# Patient Record
Sex: FEMALE | Race: BLACK OR AFRICAN AMERICAN | NOT HISPANIC OR LATINO | Employment: PART TIME | URBAN - METROPOLITAN AREA
[De-identification: names, ages, dates, MRNs, and addresses within clinical notes are randomized per-mention and may not be internally consistent; named-entity substitution may affect disease eponyms.]

---

## 2021-09-23 ENCOUNTER — ANCILLARY PROCEDURE (OUTPATIENT)
Dept: CT IMAGING | Facility: CLINIC | Age: 61
End: 2021-09-23
Attending: FAMILY MEDICINE
Payer: MEDICARE

## 2021-09-23 ENCOUNTER — OFFICE VISIT (OUTPATIENT)
Dept: PEDIATRICS | Facility: CLINIC | Age: 61
End: 2021-09-23
Payer: MEDICARE

## 2021-09-23 ENCOUNTER — OFFICE VISIT (OUTPATIENT)
Dept: URGENT CARE | Facility: URGENT CARE | Age: 61
End: 2021-09-23
Payer: MEDICARE

## 2021-09-23 VITALS
HEART RATE: 83 BPM | RESPIRATION RATE: 18 BRPM | DIASTOLIC BLOOD PRESSURE: 82 MMHG | BODY MASS INDEX: 66.33 KG/M2 | SYSTOLIC BLOOD PRESSURE: 145 MMHG | WEIGHT: 293 LBS | OXYGEN SATURATION: 100 % | TEMPERATURE: 96.4 F

## 2021-09-23 VITALS
OXYGEN SATURATION: 96 % | DIASTOLIC BLOOD PRESSURE: 87 MMHG | RESPIRATION RATE: 18 BRPM | HEART RATE: 79 BPM | TEMPERATURE: 98.1 F | SYSTOLIC BLOOD PRESSURE: 125 MMHG

## 2021-09-23 DIAGNOSIS — R10.32 ABDOMINAL PAIN, LEFT LOWER QUADRANT: ICD-10-CM

## 2021-09-23 DIAGNOSIS — R91.8 PULMONARY NODULES: ICD-10-CM

## 2021-09-23 DIAGNOSIS — B37.31 YEAST VAGINITIS: Primary | ICD-10-CM

## 2021-09-23 DIAGNOSIS — R74.8 ABNORMAL SERUM LEVEL OF LIPASE: ICD-10-CM

## 2021-09-23 DIAGNOSIS — R91.1 LUNG NODULE: Primary | ICD-10-CM

## 2021-09-23 DIAGNOSIS — D25.9 UTERINE LEIOMYOMA, UNSPECIFIED LOCATION: ICD-10-CM

## 2021-09-23 DIAGNOSIS — R10.32 ABDOMINAL PAIN, LEFT LOWER QUADRANT: Primary | ICD-10-CM

## 2021-09-23 PROBLEM — Z98.84 HISTORY OF GASTRIC BYPASS: Status: ACTIVE | Noted: 2017-10-23

## 2021-09-23 LAB
ALBUMIN SERPL-MCNC: 3.4 G/DL (ref 3.4–5)
ALBUMIN UR-MCNC: NEGATIVE MG/DL
ALP SERPL-CCNC: 107 U/L (ref 40–150)
ALT SERPL W P-5'-P-CCNC: 23 U/L (ref 0–50)
ANION GAP SERPL CALCULATED.3IONS-SCNC: 4 MMOL/L (ref 3–14)
APPEARANCE UR: CLEAR
AST SERPL W P-5'-P-CCNC: 10 U/L (ref 0–45)
BACTERIA #/AREA URNS HPF: ABNORMAL /HPF
BASOPHILS # BLD AUTO: 0 10E3/UL (ref 0–0.2)
BASOPHILS NFR BLD AUTO: 1 %
BILIRUB SERPL-MCNC: 0.4 MG/DL (ref 0.2–1.3)
BILIRUB UR QL STRIP: NEGATIVE
BUN SERPL-MCNC: 9 MG/DL (ref 7–30)
CALCIUM SERPL-MCNC: 8.6 MG/DL (ref 8.5–10.1)
CHLORIDE BLD-SCNC: 110 MMOL/L (ref 94–109)
CLUE CELLS: ABNORMAL
CO2 SERPL-SCNC: 27 MMOL/L (ref 20–32)
COLOR UR AUTO: YELLOW
CREAT SERPL-MCNC: 0.8 MG/DL (ref 0.52–1.04)
EOSINOPHIL # BLD AUTO: 0.1 10E3/UL (ref 0–0.7)
EOSINOPHIL NFR BLD AUTO: 2 %
ERYTHROCYTE [DISTWIDTH] IN BLOOD BY AUTOMATED COUNT: 13.1 % (ref 10–15)
GFR SERPL CREATININE-BSD FRML MDRD: 79 ML/MIN/1.73M2
GLUCOSE BLD-MCNC: 108 MG/DL (ref 70–99)
GLUCOSE UR STRIP-MCNC: NEGATIVE MG/DL
HCT VFR BLD AUTO: 39.6 % (ref 35–47)
HGB BLD-MCNC: 12.8 G/DL (ref 11.7–15.7)
HGB UR QL STRIP: NEGATIVE
HOLD SPECIMEN: NORMAL
HOLD SPECIMEN: NORMAL
IMM GRANULOCYTES # BLD: 0 10E3/UL
IMM GRANULOCYTES NFR BLD: 0 %
KETONES UR STRIP-MCNC: NEGATIVE MG/DL
LEUKOCYTE ESTERASE UR QL STRIP: ABNORMAL
LIPASE SERPL-CCNC: 63 U/L (ref 73–393)
LYMPHOCYTES # BLD AUTO: 1.5 10E3/UL (ref 0.8–5.3)
LYMPHOCYTES NFR BLD AUTO: 34 %
MCH RBC QN AUTO: 27.9 PG (ref 26.5–33)
MCHC RBC AUTO-ENTMCNC: 32.3 G/DL (ref 31.5–36.5)
MCV RBC AUTO: 87 FL (ref 78–100)
MONOCYTES # BLD AUTO: 0.5 10E3/UL (ref 0–1.3)
MONOCYTES NFR BLD AUTO: 11 %
MUCOUS THREADS #/AREA URNS LPF: PRESENT /LPF
NEUTROPHILS # BLD AUTO: 2.3 10E3/UL (ref 1.6–8.3)
NEUTROPHILS NFR BLD AUTO: 52 %
NITRATE UR QL: NEGATIVE
NRBC # BLD AUTO: 0 10E3/UL
NRBC BLD AUTO-RTO: 0 /100
PH UR STRIP: 5.5 [PH] (ref 5–7)
PLATELET # BLD AUTO: 236 10E3/UL (ref 150–450)
POTASSIUM BLD-SCNC: 3.9 MMOL/L (ref 3.4–5.3)
PROT SERPL-MCNC: 6.9 G/DL (ref 6.8–8.8)
RBC # BLD AUTO: 4.58 10E6/UL (ref 3.8–5.2)
RBC #/AREA URNS AUTO: ABNORMAL /HPF
SODIUM SERPL-SCNC: 141 MMOL/L (ref 133–144)
SP GR UR STRIP: 1.02 (ref 1–1.03)
SQUAMOUS #/AREA URNS AUTO: ABNORMAL /LPF
TRICHOMONAS, WET PREP: ABNORMAL
UROBILINOGEN UR STRIP-ACNC: 1 E.U./DL
WBC # BLD AUTO: 4.3 10E3/UL (ref 4–11)
WBC #/AREA URNS AUTO: ABNORMAL /HPF
WBC'S/HIGH POWER FIELD, WET PREP: ABNORMAL
YEAST, WET PREP: ABNORMAL

## 2021-09-23 PROCEDURE — 74177 CT ABD & PELVIS W/CONTRAST: CPT | Mod: ME | Performed by: RADIOLOGY

## 2021-09-23 PROCEDURE — 87210 SMEAR WET MOUNT SALINE/INK: CPT | Performed by: PHYSICIAN ASSISTANT

## 2021-09-23 PROCEDURE — 85025 COMPLETE CBC W/AUTO DIFF WBC: CPT | Performed by: FAMILY MEDICINE

## 2021-09-23 PROCEDURE — 99207 REFERRAL TO ACUTE AND DIAGNOSTIC SERVICES: CPT | Performed by: PHYSICIAN ASSISTANT

## 2021-09-23 PROCEDURE — 83690 ASSAY OF LIPASE: CPT | Performed by: FAMILY MEDICINE

## 2021-09-23 PROCEDURE — 36415 COLL VENOUS BLD VENIPUNCTURE: CPT | Performed by: FAMILY MEDICINE

## 2021-09-23 PROCEDURE — 81001 URINALYSIS AUTO W/SCOPE: CPT | Performed by: PHYSICIAN ASSISTANT

## 2021-09-23 PROCEDURE — G1004 CDSM NDSC: HCPCS | Mod: GC | Performed by: RADIOLOGY

## 2021-09-23 PROCEDURE — 80053 COMPREHEN METABOLIC PANEL: CPT | Performed by: FAMILY MEDICINE

## 2021-09-23 PROCEDURE — 99205 OFFICE O/P NEW HI 60 MIN: CPT | Performed by: FAMILY MEDICINE

## 2021-09-23 RX ORDER — IOPAMIDOL 755 MG/ML
135 INJECTION, SOLUTION INTRAVASCULAR ONCE
Status: COMPLETED | OUTPATIENT
Start: 2021-09-23 | End: 2021-09-23

## 2021-09-23 RX ORDER — ONDANSETRON 4 MG/1
4 TABLET, ORALLY DISINTEGRATING ORAL ONCE
Status: COMPLETED | OUTPATIENT
Start: 2021-09-23 | End: 2021-09-23

## 2021-09-23 RX ORDER — FLUCONAZOLE 150 MG/1
150 TABLET ORAL ONCE
Qty: 1 TABLET | Refills: 0 | Status: SHIPPED | OUTPATIENT
Start: 2021-09-23 | End: 2021-09-23

## 2021-09-23 RX ORDER — CYCLOBENZAPRINE HCL 5 MG
5 TABLET ORAL 3 TIMES DAILY PRN
Qty: 45 TABLET | Refills: 0 | Status: SHIPPED | OUTPATIENT
Start: 2021-09-23 | End: 2023-03-13

## 2021-09-23 RX ORDER — LOSARTAN POTASSIUM 25 MG/1
TABLET ORAL
COMMUNITY
Start: 2021-08-07

## 2021-09-23 RX ADMIN — ONDANSETRON 4 MG: 4 TABLET, ORALLY DISINTEGRATING ORAL at 12:43

## 2021-09-23 RX ADMIN — IOPAMIDOL 135 ML: 755 INJECTION, SOLUTION INTRAVASCULAR at 12:31

## 2021-09-23 ASSESSMENT — ENCOUNTER SYMPTOMS
SHORTNESS OF BREATH: 0
FEVER: 0
WEAKNESS: 0
BLOOD IN STOOL: 0
MYALGIAS: 0
NECK STIFFNESS: 0
ABDOMINAL PAIN: 1
BACK PAIN: 0
ARTHRALGIAS: 0
LIGHT-HEADEDNESS: 0
DIZZINESS: 0
COUGH: 0
EYES NEGATIVE: 1
NAUSEA: 0
NECK PAIN: 0
RHINORRHEA: 0
SPEECH DIFFICULTY: 0
PALPITATIONS: 0
MUSCULOSKELETAL NEGATIVE: 1
RESPIRATORY NEGATIVE: 1
SORE THROAT: 0
FACIAL ASYMMETRY: 0
CARDIOVASCULAR NEGATIVE: 1
CHILLS: 0
CONSTIPATION: 0
ALLERGIC/IMMUNOLOGIC NEGATIVE: 1
JOINT SWELLING: 0
HEADACHES: 1
DIARRHEA: 0
WOUND: 0
ENDOCRINE NEGATIVE: 1
VOMITING: 1

## 2021-09-23 ASSESSMENT — PAIN SCALES - GENERAL
PAINLEVEL: MODERATE PAIN (5)
PAINLEVEL: MODERATE PAIN (5)

## 2021-09-23 NOTE — PROGRESS NOTES
Chief Complaint:    Chief Complaint   Patient presents with     Abdominal Pain     pain in stomach in left side and feel like spasm-pain ongoing for a week, throw up yesterday, and headache, not sure if sinus infection (had an appt yesterday but got cancelled)     Headache     Vomiting         Medical Decision Making:    Vital signs reviewed by Al Kaplan PA-C  BP (!) 145/82 (BP Location: Left arm, Patient Position: Sitting, Cuff Size: Adult Large)   Pulse 83   Temp (!) 96.4  F (35.8  C) (Tympanic)   Resp 18   Wt (!) 180.8 kg (398 lb 9.6 oz)   SpO2 100%   BMI 66.33 kg/m      Differential Diagnosis:  Abdominal Pain: Constipation, IBS, Diverticular Disease, Adhesions and Viral Gastroenteritis       ASSESSMENT:     1. Abdominal pain, left lower quadrant         PLAN:     Patient is in no acute distress.  She is afebrile with stable vital signs.  Patient has tenderness in the LLQ.  Urine and wet prep were unremarkable.  At this time, I can not rule out acute diverticulitis.  Patient instructed to go to the [facility] now for further evaluation, labs, and imaging.  ADS notified and handoff completed with [facility] provider.  Patient instructed to follow up with PCP in the next week.  Patient verbalized understanding and agreed with this plan.  Patient discharged in stable condition.    Labs:     Results for orders placed or performed in visit on 09/23/21   UA Macro with Reflex to Micro and Culture - lab collect     Status: Abnormal    Specimen: Urine, Midstream   Result Value Ref Range    Color Urine Yellow Colorless, Straw, Light Yellow, Yellow    Appearance Urine Clear Clear    Glucose Urine Negative Negative mg/dL    Bilirubin Urine Negative Negative    Ketones Urine Negative Negative mg/dL    Specific Gravity Urine 1.025 1.003 - 1.035    Blood Urine Negative Negative    pH Urine 5.5 5.0 - 7.0    Protein Albumin Urine Negative Negative mg/dL    Urobilinogen Urine 1.0 0.2, 1.0 E.U./dL    Nitrite Urine Negative Negative     Leukocyte Esterase Urine Small (A) Negative   Urine Microscopic     Status: Abnormal   Result Value Ref Range    Bacteria Urine Moderate (A) None Seen /HPF    RBC Urine 0-2 0-2 /HPF /HPF    WBC Urine 0-5 0-5 /HPF /HPF    Squamous Epithelials Urine Many (A) None Seen /LPF    Mucus Urine Present (A) None Seen /LPF    Narrative    Urine Culture not indicated   Wet prep - lab collect     Status: Abnormal    Specimen: Vagina; Swab   Result Value Ref Range    Trichomonas Absent Absent    Yeast Absent Absent    Clue Cells Absent Absent    WBCs/high power field 2+ (A) None       Current Meds:    Current Outpatient Medications:      ALBUTEROL IN, , Disp: , Rfl:      ASPIRIN PO, Take 81 mg by mouth daily, Disp: , Rfl:      calcium carbonate (TUMS) 500 MG chewable tablet, Take 1 chew tab by mouth daily, Disp: , Rfl:      Cetirizine HCl (ZYRTEC PO), , Disp: , Rfl:      fluticasone (FLONASE) 50 MCG/ACT nasal spray, Spray 1 spray into both nostrils daily, Disp: , Rfl:      IBUPROFEN PO, Take 600 mg by mouth every 6 hours, Disp: , Rfl:      losartan (COZAAR) 25 MG tablet, , Disp: , Rfl:      capsaicin (ZOSTRIX) 0.025 % CREA, Apply topically 4 times daily as needed (Patient not taking: Reported on 9/23/2021), Disp: , Rfl:      cyanocobalamin (VITAMIN B12) 1000 MCG/ML injection, Inject 1,000 mcg Subcutaneous every 30 days (Patient not taking: Reported on 9/23/2021), Disp: , Rfl:      Cyclobenzaprine HCl (FLEXERIL PO), Take 10 mg by mouth (Patient not taking: Reported on 9/23/2021), Disp: , Rfl:      Furosemide (LASIX PO), , Disp: , Rfl:      ketorolac (TORADOL) 10 MG tablet, Take 1 tablet (10 mg) by mouth every 6 hours as needed for moderate pain (Patient not taking: Reported on 9/23/2021), Disp: 20 tablet, Rfl: 0     loratadine (CLARITIN) 10 MG tablet, Take 10 mg by mouth daily (Patient not taking: Reported on 9/23/2021), Disp: , Rfl:      Naproxen Sodium (ALEVE PO), Take 220 mg by mouth (Patient not taking: Reported on  9/23/2021), Disp: , Rfl:      Omega-3 Fatty Acids (OMEGA-3 FISH OIL PO), Take 1,200 mg by mouth (Patient not taking: Reported on 9/23/2021), Disp: , Rfl:      VITAMIN D, CHOLECALCIFEROL, PO, Take 5,000 Units by mouth daily (Patient not taking: Reported on 9/23/2021), Disp: , Rfl:      Zolpidem Tartrate (AMBIEN PO), rx for future sleep study (Patient not taking: Reported on 9/23/2021), Disp: , Rfl:     Allergies:  Allergies   Allergen Reactions     Codeine      Erythromycin      Morphine      Orange Juice [Orange Oil]      Tylenol [Acetaminophen]        SUBJECTIVE    HPI: Le Oconnor is an 61 year old female who presents for evaluation and treatment of abdominal pain. Started 3 weeks ago. Denies any recent illness, or sickness, travel. Pain is described as sharp pain in the left lower quadrant and it hurts when pressed. Has a h/o gastric bypass in 1991. Has never had pains like this before. Has a headache yesterday and vomited yesterday. Denies any visual changes or worst headache of her life. Denies any facial droop, slurred speech, arm weakness. Didn't have a bowel movement until about 3 days ago and then also had 1 the day before yesterday. No blood in her stool.       Patient is new to [facility].    ROS:      Review of Systems   Constitutional: Negative for chills and fever.   HENT: Negative for congestion, ear pain, rhinorrhea and sore throat.    Eyes: Negative.    Respiratory: Negative.  Negative for cough and shortness of breath.    Cardiovascular: Negative.  Negative for chest pain and palpitations.   Gastrointestinal: Positive for abdominal pain and vomiting. Negative for blood in stool, constipation, diarrhea and nausea.   Endocrine: Negative.    Genitourinary: Negative.    Musculoskeletal: Negative.  Negative for arthralgias, back pain, joint swelling, myalgias, neck pain and neck stiffness.   Skin: Negative.  Negative for rash and wound.   Allergic/Immunologic: Negative.  Negative for  immunocompromised state.   Neurological: Positive for headaches. Negative for dizziness, facial asymmetry, speech difficulty, weakness and light-headedness.        Family History   History reviewed. No pertinent family history.    Social History  Social History     Socioeconomic History     Marital status: Single     Spouse name: Not on file     Number of children: Not on file     Years of education: Not on file     Highest education level: Not on file   Occupational History     Not on file   Tobacco Use     Smoking status: Former Smoker     Packs/day: 0.50     Years: 12.00     Pack years: 6.00     Types: Cigarettes     Smokeless tobacco: Never Used     Tobacco comment: quit 30 years ago   Substance and Sexual Activity     Alcohol use: Yes     Comment: rare     Drug use: No     Comment: when much younger david.     Sexual activity: Not on file   Other Topics Concern     Not on file   Social History Narrative     Not on file     Social Determinants of Health     Financial Resource Strain:      Difficulty of Paying Living Expenses:    Food Insecurity:      Worried About Running Out of Food in the Last Year:      Ran Out of Food in the Last Year:    Transportation Needs:      Lack of Transportation (Medical):      Lack of Transportation (Non-Medical):    Physical Activity:      Days of Exercise per Week:      Minutes of Exercise per Session:    Stress:      Feeling of Stress :    Social Connections:      Frequency of Communication with Friends and Family:      Frequency of Social Gatherings with Friends and Family:      Attends [employer] Services:      Active Member of Clubs or Organizations:      Attends Club or Organization Meetings:      Marital Status:    Intimate Partner Violence:      Fear of Current or Ex-Partner:      Emotionally Abused:      Physically Abused:      Sexually Abused:         Surgical History:  Past Surgical History:   Procedure Laterality Date     ABDOMEN SURGERY      gastric by pass and  adhesions removal post bypass     GYN SURGERY      D/C     RECESSION RESECTION (REPAIR STRABISMUS) Right 6/11/2015    Procedure: RECESSION RESECTION (REPAIR STRABISMUS);  Surgeon: Jori Barclay MD;  Location: [facility]        Problem List:  Patient Active Problem List   Diagnosis     Depression     Essential hypertension     History of gastric bypass     Mild intermittent asthma     Morbid obesity (H)           OBJECTIVE:     Vital signs noted and reviewed by Al Kaplan PA-C  BP (!) 145/82 (BP Location: Left arm, Patient Position: Sitting, Cuff Size: Adult Large)   Pulse 83   Temp (!) 96.4  F (35.8  C) (Tympanic)   Resp 18   Wt (!) 180.8 kg (398 lb 9.6 oz)   SpO2 100%   BMI 66.33 kg/m       PEFR:    Physical Exam  Vitals and nursing note reviewed.   Constitutional:       General: She is not in acute distress.     Appearance: She is well-developed. She is obese. She is not ill-appearing, toxic-appearing or diaphoretic.   HENT:      Head: Normocephalic and atraumatic.      Right Ear: Tympanic membrane and external ear normal. No drainage, swelling or tenderness. Tympanic membrane is not perforated, erythematous, retracted or bulging.      Left Ear: Tympanic membrane and external ear normal. No drainage, swelling or tenderness. Tympanic membrane is not perforated, erythematous, retracted or bulging.      Nose: No mucosal edema, congestion or rhinorrhea.      Right Sinus: No maxillary sinus tenderness or frontal sinus tenderness.      Left Sinus: No maxillary sinus tenderness or frontal sinus tenderness.      Mouth/Throat:      Pharynx: No pharyngeal swelling, oropharyngeal exudate, posterior oropharyngeal erythema or uvula swelling.      Tonsils: No tonsillar abscesses.   Eyes:      Pupils: Pupils are equal, round, and reactive to light.   Neck:      Trachea: Trachea normal.   Cardiovascular:      Rate and Rhythm: Normal rate and regular rhythm.      Heart sounds: Normal heart sounds, S1 normal and  S2 normal. No murmur heard.   No friction rub. No gallop.    Pulmonary:      Effort: Pulmonary effort is normal. No respiratory distress.      Breath sounds: Normal breath sounds. No decreased breath sounds, wheezing, rhonchi or rales.   Abdominal:      General: A surgical scar is present. Bowel sounds are normal. There is no distension.      Palpations: Abdomen is soft. Abdomen is not rigid. There is no mass.      Tenderness: There is abdominal tenderness in the left lower quadrant. There is no right CVA tenderness, left CVA tenderness, guarding or rebound. Negative signs include Espinal's sign, Rovsing's sign, McBurney's sign, psoas sign and obturator sign.       Musculoskeletal:      Cervical back: Full passive range of motion without pain, normal range of motion and neck supple.   Skin:     General: Skin is warm and dry.   Neurological:      General: No focal deficit present.      Mental Status: She is alert and oriented to person, place, and time.      Cranial Nerves: No cranial nerve deficit.      Deep Tendon Reflexes: Reflexes are normal and symmetric.   Psychiatric:         Behavior: Behavior normal. Behavior is cooperative.         Thought Content: Thought content normal.         Judgment: Judgment normal.             Al Kaplan PA-C  9/23/2021, 9:48 AM

## 2021-09-23 NOTE — LETTER
[address] [address]  Phone: [phone]  Fax: [phone]    September 23, 2021        Le Oconnor  [address] [address]          To whom it may concern:    RE: Le Oconnor    Patient was seen and treated today at our clinic.  Please excuse from work today and tomorrwo    Please contact me for questions or concerns.      Sincerely,        Maday Cm MD

## 2021-09-23 NOTE — PATIENT INSTRUCTIONS
Patient Education     Common Questions about Lung Nodules  What is a lung nodule?  A nodule is a small spot in the lung that is more solid than normal tissue. It is seen with a chest X-ray or a CT scan. The cause of nodules may be scar tissue, an infection or some irritant in the air. Sometimes, a nodule is an early lung cancer.  What is the chance that the nodule is an early lung cancer?  Most small nodules are not cancer. Fewer than 5 out of 100 people with nodules turn out to have cancer. The risk is greater for patients who:    Are olderw    Have a nodule of 9 mm or larger    Have smoked or still smoke cigarettes    Have added risks, such as a family history of lung cancer or working with asbestos.  If you would like to know more about your risk for lung cancer, please talk to your provider.  What size is a small lung nodule?  A small nodule is less than 9 mm across: 6, 7 or 8 mm. Picture a pea--it's 5mm and a cherry is 10 mm.     What will happen next?    Your care team will probably recommend more CT scans to watch the nodule for changes.    A nodule that is not cancer usually doesn't grow. Generally, if the nodule doesn't grow for 2 years, it is safe to stop the scans. But certain types of nodules may need a longer follow-up.    If the nodule is getting bigger, we will use other studies or do a biopsy to get a closer look.  How do you know how to time the next scan?  We decide when to do a scan based on the size of the nodule and your risk for cancer.   You can help decide when to get the next CT scan. Call your healthcare team if you have questions or concerns about the date.  Is it safe to wait for the next scan?  Most cancers grow fairly slowly. Waiting a few months for the next scan is thought to be very safe.   Why shouldn't I get a biopsy now?    Biopsies are safer for nodules that are 9 mm or larger.    During a biopsy, the doctor removes a small piece of your lung and looks at it under a microscope.  It is difficult to biopsy small nodules safely. It could cause breathing problems, bleeding or infection.  What if I'm still smoking?  We are here to help you quit! Quitting now will lower your chance of getting lung cancer, and other serious health problems like emphysema and heart disease. For help quitting: www.quitplan.[employer] or call 1-103.157.5157 for one-on-one coaching from counselors. [place] adults may also receive free patches, gum or lozenges.  What if my nodule is lung cancer?  If a nodule turns out to be lung cancer, it is likely to be in an early stage. If treated at an early stage, you are more likely to survive the cancer.   Please talk with your health care team if you have any concerns about lung cancer. Remember:    Most small nodules are not lung cancer.    Biopsies of small nodules can cause more harm than good.    If you are still smoking, the best way to improve your health is to quit.    It is normal to be worried when there is even a small chance of lung cancer.  When should I call for help?  Call your care team if you:    Have a new or worse cough, or cough up blood    Have shortness of breath, chest pain, fevers or chills    Lose 10 pounds or more without trying to lose weight    Feel worried and anxious  Resources  [place] National Library of Medicine:   https://medlineplus.gov/ency/article/849971.htm  CDC Tobacco Resources  www.cdc.gov/tobacco/campaign/tips/quit-smoking  www.Smokefree.gov  For informational purposes only. Not to replace the advice of your health care provider. Copyright  2016 [facility]. All rights reserved. [employer] 747329 - Rev 11/16.           Patient Education     Flexeril Oral Tablet 5 mg  Uses  This medicine is used for the following purposes:    inflammation    muscle spasms  Instructions  This medicine may be taken with or without food.  Keep the medicine at room temperature. Avoid heat and direct light.  If you forget to take a dose on time, take it as  soon as you remember. If it is almost time for the next dose, do not take the missed dose. Return to your normal dosing schedule. Do not take 2 doses of this medicine at one time.  Please tell your doctor and pharmacist about all the medicines you take. Include both prescription and over-the-counter medicines. Also tell them about any vitamins, herbal medicines, or anything else you take for your health.  Cautions  Tell your doctor and pharmacist if you ever had an allergic reaction to a medicine. Symptoms of an allergic reaction can include trouble breathing, skin rash, itching, swelling, or severe dizziness.  Do not use the medication any more than instructed.  Your ability to stay alert or to react quickly may be impaired by this medicine. Do not drive or operate machinery until you know how this medicine will affect you.  Do not drink beverages with alcohol while on this medicine.  Avoid becoming overheated during exercise or other activities. Try to stay cool in hot weather.  Tell the doctor or pharmacist if you are pregnant, planning to be pregnant, or breastfeeding.  Ask your pharmacist if this medicine can interact with any of your other medicines. Be sure to tell them about all the medicines you take.  Do not start or stop any other medicines without first speaking to your doctor or pharmacist.  Do not share this medicine with anyone who has not been prescribed this medicine.  Side Effects  The following is a list of some common side effects from this medicine. Please speak with your doctor about what you should do if you experience these or other side effects.    constipation    dizziness    drowsiness or sedation    dry mouth    lack of energy and tiredness  A few people may have an allergic reactions to this medicine. Symptoms can include difficulty breathing, skin rash, itching, swelling, or severe dizziness. If you notice any of these symptoms, seek medical help quickly.  Extra  Please speak with your  doctor, nurse, or pharmacist if you have any questions about this medicine.  [email or web address]  IMPORTANT NOTE: This document tells you briefly how to take your medicine, but it does not tell you all there is to know about it.Your doctor or pharmacist may give you other documents about your medicine. Please talk to them if you have any questions.Always follow their advice. There is a more complete description of this medicine available in English.Scan this code on your smartphone or tablet or use the web address below. You can also ask your pharmacist for a printout. If you have any questions, please ask your pharmacist.     2021 [employer].

## 2021-09-23 NOTE — PROGRESS NOTES
"        Subjective   Le is a 61 year old who presents for the following health issues     HPI Pt. States that she has had intermediate left side pain for the past 3 weeks that has been getting worse this past week. \"I feel like something is pulling muscles.\" No fever or further illness noted.,      Abdominal/Flank Pain  Onset/Duration: 3-4 weeks, worse over the past week. Left LUQ and LLQ pain n  Description:   Character: Cramping \"spasms.\"   Location: left upper quadrant and LLQ  Radiation: None  Intensity: moderate  Progression of Symptoms:  worsening  Accompanying Signs & Symptoms:  Fever/Chills: no  Gas/Bloating: no  Nausea: YES Vomited 9/22/2021 X1 - felt nausea - nothing out of the norm for her with history of gastric bypass   Vomitting: YES  Diarrhea: no  Constipation: YES Last BM 2 days ago  Dysuria or Hematuria: YES \"alittle burning before\" 2 weeks ago. No current SX of UTI   History:   Trauma: no  Previous similar pain: YES \"on and off for awhile.\"   Previous tests done: none  Precipitating factors:   Does the pain change with:     Food: no    Bowel Movement: no    Urination: no   Other factors: When sitting up from a laying position.   Therapies tried and outcome:  Ibuprofen at home for arthritis   No LMP recorded. Patient is postmenopausal.      Left side has been having this episodes of a catch where all of a sudden she would bend over come back up and something will spasm in both lower abdominal area    But then the past 3 weeks it's been more on the left side    Since the past 6 days has been bothering     Patient has had a gastric bypass   melena, hematochezia, or hematemesis: none  Problem list, Medication list, Allergies, and Medical/Social/Surgical histories reviewed in [facility] and updated as appropriate.      ROS:    Constitutional, HEENT, cardiovascular, pulmonary, GI, , musculoskeletal, neuro, skin, endocrine and psych systems are negative, except as otherwise noted.    OBJECTIVE:  BP " 125/87 (BP Location: Right arm, Patient Position: Sitting, Cuff Size: Adult Regular)   Pulse 79   Temp 98.1  F (36.7  C) (Oral)   Resp 18   SpO2 96%    General:   awake, alert, and cooperative.  NAD.   Head: Normocephalic, atraumatic.  Eyes: Conjunctiva clear, non icteric. MARCOS  Heart: Regular rate and rhythm. No murmur.  Lungs: Chest is clear; no wheezes or rales.  ABD: soft, mild lower abdominal wall  tenderness to palpation , no rigidity, guarding or rebound , bowel sounds intact  RECTAL: declined/deferred  Skin: no rashes   Neuro: Alert and oriented - normal speech.       Diagnostic Test Results:  Results for orders placed or performed in visit on 09/23/21 (from the past 24 hour(s))   Lipase   Result Value Ref Range    Lipase 63 (L) 73 - 393 U/L   Comprehensive metabolic panel   Result Value Ref Range    Sodium 141 133 - 144 mmol/L    Potassium 3.9 3.4 - 5.3 mmol/L    Chloride 110 (H) 94 - 109 mmol/L    Carbon Dioxide (CO2) 27 20 - 32 mmol/L    Anion Gap 4 3 - 14 mmol/L    Urea Nitrogen 9 7 - 30 mg/dL    Creatinine 0.80 0.52 - 1.04 mg/dL    Calcium 8.6 8.5 - 10.1 mg/dL    Glucose 108 (H) 70 - 99 mg/dL    Alkaline Phosphatase 107 40 - 150 U/L    AST 10 0 - 45 U/L    ALT 23 0 - 50 U/L    Protein Total 6.9 6.8 - 8.8 g/dL    Albumin 3.4 3.4 - 5.0 g/dL    Bilirubin Total 0.4 0.2 - 1.3 mg/dL    GFR Estimate 79 >60 mL/min/1.73m2   CBC with platelets differential    Narrative    The following orders were created for panel order CBC with platelets differential.  Procedure                               Abnormality         Status                     ---------                               -----------         ------                     CBC with platelets and d...[[id number]]                      Final result                 Please view results for these tests on the individual orders.   CBC with platelets and differential   Result Value Ref Range    WBC Count 4.3 4.0 - 11.0 10e3/uL    RBC Count 4.58 3.80 - 5.20 10e6/uL     Hemoglobin 12.8 11.7 - 15.7 g/dL    Hematocrit 39.6 35.0 - 47.0 %    MCV 87 78 - 100 fL    MCH 27.9 26.5 - 33.0 pg    MCHC 32.3 31.5 - 36.5 g/dL    RDW 13.1 10.0 - 15.0 %    Platelet Count 236 150 - 450 10e3/uL    % Neutrophils 52 %    % Lymphocytes 34 %    % Monocytes 11 %    % Eosinophils 2 %    % Basophils 1 %    % Immature Granulocytes 0 %    NRBCs per 100 WBC 0 <1 /100    Absolute Neutrophils 2.3 1.6 - 8.3 10e3/uL    Absolute Lymphocytes 1.5 0.8 - 5.3 10e3/uL    Absolute Monocytes 0.5 0.0 - 1.3 10e3/uL    Absolute Eosinophils 0.1 0.0 - 0.7 10e3/uL    Absolute Basophils 0.0 0.0 - 0.2 10e3/uL    Absolute Immature Granulocytes 0.0 <=0.0 10e3/uL    Absolute NRBCs 0.0 10e3/uL   Extra Tube    Narrative    The following orders were created for panel order Extra Tube.  Procedure                               Abnormality         Status                     ---------                               -----------         ------                     Extra Blue Top Tube[[id number]]                              In process                 Extra Serum Separator Tu...[[id number]]                      In process                   Please view results for these tests on the individual orders.     ASSESSMENT:well appearing    ICD-10-CM    1. Yeast vaginitis  B37.3 fluconazole (DIFLUCAN) 150 MG tablet   2. Abdominal pain, left lower quadrant  R10.32 Referral to Acute and Diagnostic Services (Day of diagnostic / First order acute)     CBC with platelets differential     Comprehensive metabolic panel     Lipase     CT Abdomen Pelvis w Contrast     Lipase     Comprehensive metabolic panel     CBC with platelets differential     ondansetron (ZOFRAN-ODT) ODT tab 4 mg     cyclobenzaprine (FLEXERIL) 5 MG tablet   3. Pulmonary nodules  R91.8 Oncology/Hematology Adult Referral   4. Uterine leiomyoma, unspecified location  D25.9 Ob/Gyn Referral   5. Abnormal serum level of lipase  R74.8          PLAN:   Stat CT done to rule out acute  diverticulitis   Abdominal pain on history and exam suspect abdominal wall pain  Prescribed with flexeril. Warned sedating  Sedating medications given. Aware not to drive or operate machinery while on these medications. Caution with .   Patient with vaginal concerns - wet prep earlier normal. Patient feels more outside. She declines pelvic exam but asked for empiric treatment  With one dose fluconazole  zofran as needed nausea  CT no diverticulitis  Incidental subpleural nodule -referred to lung nodule clinic  Uterine myomas- patient already aware she has them ? If contributing to her pain. Referred to GYN for follow up.  Follow up with primary care provider to follow up ADS visit  Patient with mild lipase decrease - but no clinical signs or symptoms of insufficiency. No diarrhea. Signs or symptoms of lipase insufficiency reviewed if concerns recommend evaluation.  CT normal pancreatic ducts   Advised about symptoms which might herald more serious problems.    adverse reactions of medication discussed  Over the counter medications discussed  advised to come back in right away if with any worsening symptoms or if with no relief despite treatment plan  close follow-up recommended.  patient voiced understanding and had no further questions at this time.        Maday Cm MD

## 2021-10-05 ENCOUNTER — ANCILLARY PROCEDURE (OUTPATIENT)
Dept: CT IMAGING | Facility: CLINIC | Age: 61
End: 2021-10-05
Attending: CLINICAL NURSE SPECIALIST
Payer: MEDICARE

## 2021-10-05 DIAGNOSIS — R91.1 LUNG NODULE: ICD-10-CM

## 2021-10-05 PROCEDURE — 71250 CT THORAX DX C-: CPT | Mod: MG | Performed by: RADIOLOGY

## 2021-10-05 PROCEDURE — G1004 CDSM NDSC: HCPCS | Performed by: RADIOLOGY

## 2021-10-08 ENCOUNTER — NURSE TRIAGE (OUTPATIENT)
Dept: NURSING | Facility: CLINIC | Age: 61
End: 2021-10-08

## 2021-10-08 NOTE — TELEPHONE ENCOUNTER
Needs ct scan results. Was supposed to have an e visit and it wouldn't connect on the 6th.    IMPRESSION: No significant change in 3 mm left lower lobe pulmonary  Nodule. [phone]. I found that it's the [facility] who is to be working with her. She will call them when they open at 7 a.m. to try and discuss next steps. I found no charting or note on the 6th.  Hafsa Buck RN  [facility]      Reason for Disposition    Health Information question, no triage required and triager able to answer question    Additional Information    Negative: [1] Caller is not with the adult (patient) AND [2] reporting urgent symptoms    Negative: Lab result questions    Negative: Medication questions    Negative: Caller can't be reached by phone    Negative: Caller has already spoken to PCP or another triager    Negative: RN needs further essential information from caller in order to complete triage    Negative: Requesting regular office appointment    Negative: [1] Caller requesting NON-URGENT health information AND [2] PCP's office is the best resource    Protocols used: INFORMATION ONLY CALL - NO TRIAGE-A-

## 2021-10-13 NOTE — TELEPHONE ENCOUNTER
RECORDS STATUS - ALL OTHER DIAGNOSIS      RECORDS RECEIVED FROM: [facility]   DATE RECEIVED: 10/13   NOTES STATUS DETAILS   OFFICE NOTE from referring provider Maday Guerra MD in [facility] FP/IM/PEDS: 9/23/21   OFFICE NOTE from medical oncologist     DISCHARGE SUMMARY from hospital     DISCHARGE REPORT from the ER [facility] 9/23/21 ()   OPERATIVE REPORT     MEDICATION LIST [facility]    CLINICAL TRIAL TREATMENTS TO DATE     LABS     PATHOLOGY REPORTS     ANYTHING RELATED TO DIAGNOSIS Epic 9/23/21   GENONOMIC TESTING     TYPE:     IMAGING (NEED IMAGES & REPORT)     CT SCANS PACS 10/5/21, 9/23/21: [facility]   MRI     MAMMO     ULTRASOUND     PET

## 2021-10-19 ENCOUNTER — OFFICE VISIT (OUTPATIENT)
Dept: OBGYN | Facility: CLINIC | Age: 61
End: 2021-10-19
Payer: MEDICARE

## 2021-10-19 VITALS
BODY MASS INDEX: 68.09 KG/M2 | HEART RATE: 101 BPM | DIASTOLIC BLOOD PRESSURE: 78 MMHG | SYSTOLIC BLOOD PRESSURE: 127 MMHG | WEIGHT: 293 LBS

## 2021-10-19 DIAGNOSIS — D25.9 UTERINE LEIOMYOMA, UNSPECIFIED LOCATION: ICD-10-CM

## 2021-10-19 PROCEDURE — 99203 OFFICE O/P NEW LOW 30 MIN: CPT | Performed by: OBSTETRICS & GYNECOLOGY

## 2021-10-19 NOTE — PATIENT INSTRUCTIONS
If you have any questions regarding your visit, Please contact your care team.  [facility]: [phone]  [facility] CLINIC HOURS TELEPHONE NUMBER   Cephas Agbeh, M.D. Becky-RN Kylie-RN Heidi-RN Deanna-MA Angela-  Asiya-         Monday-Jv    8:00a.m-4:45 p.m    Tuesday--[place]     8:00a.m-4:45 p.m.    Thursday-Jv    8:00a.m-4:45 p.m.    Friday-Jv    8:00a.m-4:45 p.m    [facility]   [address]   [place] [address]   [phone]   Fax [phone]   Tbhyuic-[phone]     [facility]   [address] [place] [address] [place] [address]   [phone]    [facility]  [address] [facility] [address]  [phone]  Sfhnwor-[phone]   Urgent Care locations:    [facility] Monday-Friday  10 am - 8 pm  Saturday and Sunday   9 am - 5 pm   Monday-Friday   10 am- 8 pm  Saturday and Sunday  9 am - 5 pm    [phone]   If you need a medication refill, please contact your pharmacy. Please allow 3 business days for your refill to be completed.  As always, Thank you for trusting us with your healthcare needs!

## 2021-10-19 NOTE — PROGRESS NOTES
Le is a 61 year old  referred here by Dr ALLRED for consultation regarding incidental finding of myomatous uterus on a CT scan as bellow.  She is postmenopause x 13 year. No bleeding. Occ. Cramps.    .CT of the Abdomen and Pelvis with contrast, 2021 12:35 PM.     Comparison: None.     History: Diverticulitis suspected; Abdominal pain, left lower  quadrant.      Technique: Axial images of the  abdomen and pelvis were obtained with  contrast. Coronal reconstructions were provided. Images were reviewed  in bone, lung, and soft tissue windows.      Findings:     Lung bases:   Heart size is normal. No pericardial effusion. Postsurgical Mirian-en-Y  gastric bypass changes. Lung bases are clear. No pleural effusion. No  focal consolidation. 3 mm subpleural nodule along the posterior left  lower lobe (series 3, image 101).     Abdomen and Pelvis:   Liver is normal. No focal enhancing lesion. No intra or extrahepatic  biliary duct dilation. Gallbladder is normal. No wall thickening or  calcified stone. Spleen size is within normal limits. Fatty atrophy of  the pancreas. The main pancreatic, bile ducts are not dilated.     Adrenal glands are normal. Symmetric renal enhancement. No  hydronephrosis, calcified stone, or contour deforming mass. Bladder is  incompletely distended. Myomatous uterus. No adnexal mass.     Postsurgical Mirian-en-Y gastric bypass changes without evidence for  focal narrowing or transition. No small or large bowel wall thickening  or dilation. The appendix is normal. No free intraperitoneal air. No  intra-abdominal or pelvic free fluid. Redundant sigmoid colon with  scattered colonic diverticulosis without additional evidence to  suggest acute diverticulitis.     Abdominal aorta and major branches are normal in caliber and patent  without significant atherosclerotic disease.     No mesenteric, retroperitoneal, or pelvic lymphadenopathy by size  criteria.     Bones and Soft Tissues:   No  suspicious osseous lesion. No suspicious mass. Multilevel  degenerative changes of the thoracolumbar spine. Grade 1  anterolisthesis of L3 on L4.                                                                      Impression:   1.  No acute findings in the abdomen or pelvis to explain patient's  symptoms.  2.  Postsurgical changes of gastric bypass without evidence for  obstruction.  3.  Scattered colonic diverticulosis without additional evidence  suggesting acute diverticulitis.  4.  Additional incidental findings including a 3 mm subpleural nodule  in the posterior left lower lobe and myomatous uterus.     I have personally reviewed the examination and initial interpretation  and I agree with the findings.     JHONATHAN SAAVEDRA MD          ROS: Ten point review of systems was reviewed and negative except the above.  OB History    Para Term  AB Living   6 3 3 0 3 3   SAB TAB Ectopic Multiple Live Births   0 0 0 0 3      # Outcome Date GA Lbr Yovanny/2nd Weight Sex Delivery Anes PTL Lv   6 Term 10/19/98    F    FRANCISCO J   5 AB 10/19/90           4 AB 10/19/84           3 Term 10/19/84    F    FRANCISCO J   2 AB 10/19/78           1 Term 10/19/76    F    FRANCISCO J         Past Medical History:   Diagnosis Date     DJD (degenerative joint disease)      PONV (postoperative nausea and vomiting)      Sleep apnea     formal study in 2015     Past Surgical History:   Procedure Laterality Date     ABDOMEN SURGERY      gastric by pass and adhesions removal post bypass     GYN SURGERY      D/C     RECESSION RESECTION (REPAIR STRABISMUS) Right 2015    Procedure: RECESSION RESECTION (REPAIR STRABISMUS);  Surgeon: Jori Barclay MD;  Location: [facility]     Patient Active Problem List   Diagnosis     Depression     Essential hypertension     History of gastric bypass     Mild intermittent asthma     Morbid obesity (H)       ALL/Meds: Her medication and allergy histories were reviewed and are documented in  their appropriate chart areas.    SH: - tob, - EtOH,     FH: Her family history was reviewed and documented in its appropriate chart area.    PE: /78   Pulse 101   Wt (!) 185.6 kg (409 lb 3.2 oz)   Breastfeeding No   BMI 68.09 kg/m    Body mass index is 68.09 kg/m .    General Appearance:  healthy, alert, active, no distress  HEENT: NCAT      A/P    ICD-10-CM    1. Uterine leiomyoma, unspecified location  D25.9 Ob/Gyn Referral     US Pelvic Complete with Transvaginal     I discussed fibroids.  We discussed their origin, the fact that while they are benign, they do tend to grow slowly in response to estrogen.  We discussed the normal resolution that gradually occurs after menopause.  I explained that fibroids are very common and in many cases do not cause symptoms.  We discussed these symptoms, including menorrhagia, metrorrhagia, dysmenorrhea as well as the mass effect that fibroids can cause.  We discussed options for treatment.  These include conservative observation, symptomatic hormonal control, myomectomy, uterine artery embolization, and hysterectomy.  We discussed the RISKS, BENEFITS, AND ALTERNATIVES of each of these options.  This includes the risk of post-procedure pain, passage of a necrosed fibroid and the need for post embolization hysterectomy.   Since she has no symptoms at this time she has opted foe pelvic ultrasound.for further evaluation'    ACOG pamphlet was provided on the above topics.    Total time preparing to see patient with reviewing prior encounter and labs, face to face time,  and coordinating care on the same calendar date: 30 mins.  CEPHAS AGBEH, MD.

## 2021-10-20 ENCOUNTER — PRE VISIT (OUTPATIENT)
Dept: PULMONOLOGY | Facility: CLINIC | Age: 61
End: 2021-10-20

## 2021-10-20 ENCOUNTER — VIRTUAL VISIT (OUTPATIENT)
Dept: PULMONOLOGY | Facility: CLINIC | Age: 61
End: 2021-10-20
Attending: INTERNAL MEDICINE
Payer: MEDICARE

## 2021-10-20 DIAGNOSIS — R91.8 PULMONARY NODULES: ICD-10-CM

## 2021-10-20 PROCEDURE — 99214 OFFICE O/P EST MOD 30 MIN: CPT | Mod: 95 | Performed by: INTERNAL MEDICINE

## 2021-10-20 PROCEDURE — G0463 HOSPITAL OUTPT CLINIC VISIT: HCPCS | Mod: PN,RTG | Performed by: INTERNAL MEDICINE

## 2021-10-20 NOTE — PROGRESS NOTES
Le is a 61 year old who is being evaluated via a billable video visit.      How would you like to obtain your AVS? MyChart  If the video visit is dropped, the invitation should be resent by: Text to cell phone: [phone]  Will anyone else be joining your video visit? No      Video Start Time: 450  Video-Visit Details    Type of service:  Video Visit    Video End Time:500    Originating Location (pt. Location): Home    Distant Location (provider location):  [facility]     Platform used for Video Visit: [facility]     LUNG NODULE & INTERVENTIONAL PULMONARY CLINIC  CLINICS & SUR[facility]ERY [facility]ENTER, [facility]     Le Oconnor MRN# [id number]   Age: 61 year old YOB: 1960       Requesting Physician: Maday Cm MD  [address] [place] [address]       Assessment and Plan:    1. New solitary pulmonary lung nodule(s). Given the characteristics on current/previous imaging and risk factors; I would classify this to be Low (<6%) risk for cancer. CT chest in one year.    Because of her low smoking history, age I explained that her risk is not high but we will continue to follow.  She does not meet criteria for screening.  If her CT is negative at 1 year this probably does need to be followed.    Addendum: multiple old abd ct from 2018 and 2019 reviewed and the nodule is present then.  This nodule does not need to be followed up further.     I left a message with Le.         History:     Le Oconnor is a 61 year old female with sig h/o for some smoking history who is here for evaluation/followup of lung nodule(s).  She had some abdominal issues and had an incidental finding of lower lobe nodule on CT scan.  She had a completion chest CT that did not show any other nodules.  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  She has multiple family members with  lung cancer and is concerned that her risk may be increased.      - My interpretation of the images relevant for this visit includes: Very small left lower lobe nodule.           Past Medical History:      Past Medical History:   Diagnosis Date     DJD (degenerative joint disease)      PONV (postoperative nausea and vomiting)      Sleep apnea     formal study in [place] 2015           Past Surgical History:      Past Surgical History:   Procedure Laterality Date     ABDOMEN SURGERY      gastric by pass and adhesions removal post bypass     GYN SURGERY      D/C     RECESSION RESECTION (REPAIR STRABISMUS) Right 6/11/2015    Procedure: RECESSION RESECTION (REPAIR STRABISMUS);  Surgeon: Jori Barclay MD;  [facility]: [facility]          Social History:     Social History     Tobacco Use     Smoking status: Former Smoker     Packs/day: 0.50     Years: 12.00     Pack years: 6.00     Types: Cigarettes     Smokeless tobacco: Never Used     Tobacco comment: quit 30 years ago   Substance Use Topics     Alcohol use: Yes     Comment: rare          Family History:   No family history on file.        Allergies:      Allergies   Allergen Reactions     Codeine      Erythromycin      Morphine      Orange Juice [Orange Oil]      Tylenol [Acetaminophen]           Medications:     Current Outpatient Medications   Medication Sig     ALBUTEROL IN      ASPIRIN PO Take 81 mg by mouth daily     calcium carbonate (TUMS) 500 MG chewable tablet Take 1 chew tab by mouth daily     Cetirizine HCl (ZYRTEC PO)      cyclobenzaprine (FLEXERIL) 5 MG tablet Take 1 tablet (5 mg) by mouth 3 times daily as needed for muscle spasms     fluticasone (FLONASE) 50 MCG/ACT nasal spray Spray 1 spray into both nostrils daily     IBUPROFEN PO Take 600 mg by mouth every 6 hours     losartan (COZAAR) 25 MG tablet      capsaicin (ZOSTRIX) 0.025 % CREA Apply topically 4 times daily as needed (Patient not taking: Reported on 9/23/2021)     cyanocobalamin (VITAMIN  B12) 1000 MCG/ML injection Inject 1,000 mcg Subcutaneous every 30 days (Patient not taking: Reported on 9/23/2021)     Cyclobenzaprine HCl (FLEXERIL PO) Take 10 mg by mouth (Patient not taking: Reported on 9/23/2021)     Furosemide (LASIX PO)  (Patient not taking: Reported on 9/23/2021)     ketorolac (TORADOL) 10 MG tablet Take 1 tablet (10 mg) by mouth every 6 hours as needed for moderate pain (Patient not taking: Reported on 9/23/2021)     loratadine (CLARITIN) 10 MG tablet Take 10 mg by mouth daily (Patient not taking: Reported on 9/23/2021)     Naproxen Sodium (ALEVE PO) Take 220 mg by mouth (Patient not taking: Reported on 9/23/2021)     Omega-3 Fatty Acids (OMEGA-3 FISH OIL PO) Take 1,200 mg by mouth (Patient not taking: Reported on 9/23/2021)     VITAMIN D, CHOLECALCIFEROL, PO Take 5,000 Units by mouth daily (Patient not taking: Reported on 9/23/2021)     Zolpidem Tartrate (AMBIEN PO) rx for future sleep study (Patient not taking: Reported on 9/23/2021)     No current facility-administered medications for this visit.          Review of Systems:     See HPI         Physical Exam:   There were no vitals taken for this visit.    Constitutional - looks well, in no apparent distress  Eyes - no redness or discharge  Respiratory -breathing appears comfortable. No wheeze or rhonchi.   Cardiac -- Normal rate, rhythm.   Skin - No appreciable discoloration or lesions (very limited exam)  Neurological - No apparent tremors. Speech fluent and articlate  Psychiatric - no signs of delirium or anxiety          Current Laboratory Data:   All laboratory and imaging data reviewed.    No results found for this or any previous visit (from the past 24 hour(s)).

## 2021-10-20 NOTE — LETTER
10/20/2021       RE: Le Oconnor  [address]  [place] [address]     Dear Colleague,    Thank you for referring your patient, Le Oconnor, to the [facility] at [facility]. Please see a copy of my visit note below.    Le is a 61 year old who is being evaluated via a billable video visit.      How would you like to obtain your AVS? MyChart  If the video visit is dropped, the invitation should be resent by: Text to cell phone: [phone]  Will anyone else be joining your video visit? No      Video Start Time: 450  Video-Visit Details    Type of service:  Video Visit    Video End Time:500    Originating Location (pt. Location): Home    Distant Location (provider location):  [facility]     Platform used for Video Visit: [facility]     LUNG NODULE & INTERVENTIONAL PULMONARY CLINIC  CLINICS & SURGERY CENTER, [facility]     Le Oconnor MRN# [id number]   Age: 61 year old YOB: 1960       Requesting Physician: Maday Cm [place]  [address] [address] [facility]  [place] [address]       Assessment and Plan:    1. New solitary pulmonary lung nodule(s). Given the characteristics on current/previous imaging and risk factors; I would classify this to be Low (<6%) risk for cancer. CT chest in one year.    Because of her low smoking history, age I explained that her risk is not high but we will continue to follow.  She does not meet criteria for screening.  If her CT is negative at 1 year this probably does need to be followed.    Addendum: multiple old abd ct from 2018 and 2019 reviewed and the nodule is present then.  This nodule does not need to be followed up further.     I left a message with Le.         History:     Le Oconnor is a 61 year old female with sig h/o for some smoking history who is here for  evaluation/followup of lung nodule(s).  She had some abdominal issues and had an incidental finding of lower lobe nodule on CT scan.  She had a completion chest CT that did not show any other nodules.  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  She has multiple family members with lung cancer and is concerned that her risk may be increased.      - My interpretation of the images relevant for this visit includes: Very small left lower lobe nodule.           Past Medical History:      Past Medical History:   Diagnosis Date     DJD (degenerative joint disease)      PONV (postoperative nausea and vomiting)      Sleep apnea     formal study in J[place] 2015           Past Surgical History:      Past Surgical History:   Procedure Laterality Date     ABDOMEN SURGERY      gastric by pass and adhesions removal post bypass     GYN SURGERY      D/C     RECESSION RESECTION (REPAIR STRABISMUS) Right 6/11/2015    Procedure: RECESSION RESECTION (REPAIR STRABISMUS);  Surgeon: Jori Barclay MD;  Location: [facility]          Social History:     Social History     Tobacco Use     Smoking status: Former Smoker     Packs/day: 0.50     Years: 12.00     Pack years: 6.00     Types: Cigarettes     Smokeless tobacco: Never Used     Tobacco comment: quit 30 years ago   Substance Use Topics     Alcohol use: Yes     Comment: rare          Family History:   No family history on file.        Allergies:      Allergies   Allergen Reactions     Codeine      Erythromycin      Morphine      Orange Juice [Orange Oil]      Tylenol [Acetaminophen]           Medications:     Current Outpatient Medications   Medication Sig     ALBUTEROL IN      ASPIRIN PO Take 81 mg by mouth daily     calcium carbonate (TUMS) 500 MG chewable tablet Take 1 chew tab by mouth daily     Cetirizine HCl (ZYRTEC PO)      cyclobenzaprine (FLEXERIL) 5 MG tablet Take 1 tablet (5 mg) by mouth 3 times daily as needed for muscle spasms      fluticasone (FLONASE) 50 MCG/ACT nasal spray Spray 1 spray into both nostrils daily     IBUPROFEN PO Take 600 mg by mouth every 6 hours     losartan (COZAAR) 25 MG tablet      capsaicin (ZOSTRIX) 0.025 % CREA Apply topically 4 times daily as needed (Patient not taking: Reported on 9/23/2021)     cyanocobalamin (VITAMIN B12) 1000 MCG/ML injection Inject 1,000 mcg Subcutaneous every 30 days (Patient not taking: Reported on 9/23/2021)     Cyclobenzaprine HCl (FLEXERIL PO) Take 10 mg by mouth (Patient not taking: Reported on 9/23/2021)     Furosemide (LASIX PO)  (Patient not taking: Reported on 9/23/2021)     ketorolac (TORADOL) 10 MG tablet Take 1 tablet (10 mg) by mouth every 6 hours as needed for moderate pain (Patient not taking: Reported on 9/23/2021)     loratadine (CLARITIN) 10 MG tablet Take 10 mg by mouth daily (Patient not taking: Reported on 9/23/2021)     Naproxen Sodium (ALEVE PO) Take 220 mg by mouth (Patient not taking: Reported on 9/23/2021)     Omega-3 Fatty Acids (OMEGA-3 FISH OIL PO) Take 1,200 mg by mouth (Patient not taking: Reported on 9/23/2021)     VITAMIN D, CHOLECALCIFEROL, PO Take 5,000 Units by mouth daily (Patient not taking: Reported on 9/23/2021)     Zolpidem Tartrate (AMBIEN PO) rx for future sleep study (Patient not taking: Reported on 9/23/2021)     No current facility-administered medications for this visit.          Review of Systems:     See HPI         Physical Exam:   There were no vitals taken for this visit.    Constitutional - looks well, in no apparent distress  Eyes - no redness or discharge  Respiratory -breathing appears comfortable. No wheeze or rhonchi.   Cardiac -- Normal rate, rhythm.   Skin - No appreciable discoloration or lesions (very limited exam)  Neurological - No apparent tremors. Speech fluent and articlate  Psychiatric - no signs of delirium or anxiety          Current Laboratory Data:   All laboratory and imaging data reviewed.    No results found for this  or any previous visit (from the past 24 hour(s)).                   Again, thank you for allowing me to participate in the care of your patient.      Sincerely,    Grant Nichole MD

## 2021-10-21 ENCOUNTER — DOCUMENTATION ONLY (OUTPATIENT)
Dept: ONCOLOGY | Facility: CLINIC | Age: 61
End: 2021-10-21

## 2021-10-21 NOTE — PROGRESS NOTES
Action    Action Taken 10/21/2021 3:22pm SAMREEN     I called [facility] to request recent images [phone]

## 2021-10-25 ENCOUNTER — PATIENT OUTREACH (OUTPATIENT)
Dept: CARE COORDINATION | Facility: CLINIC | Age: 61
End: 2021-10-25

## 2021-10-25 NOTE — PROGRESS NOTES
Oncology Distress Screening Follow-up  Clinical Social Work  [facility]    Identified Concern and Score From Distress Screenin. How concerned are you about your ability to eat?   0           2. How concerned are you about unintended weight loss or your current weight?   0           3. How concerned are you about feeling depressed or very sad?   7Abnormal            4. How concerned are you about feeling anxious or very scared?   7Abnormal            5. Do you struggle with the loss of meaning and mike in your life?   Somewhat           6. How concerned are you about work and home life issues that may be affected by your cancer?   10Abnormal            7. How concerned are you about knowing what resources are available to help you?   3           8. Do you currently have what you would describe as Evangelical or spiritual struggles?              Not at all           You can also ask to be contacted by one of our Oncology Supportive Care professionals.    9. If you want to be contacted by one of our professionals, I can send a message to them right now.   Oncology Psych-  ologist;Oncolo-  gy Dietitian             Date of Distress Screening: 10/20/21    Intervention:   Le is a 61-year-old woman who met with Dr. Nichole 10/20/21. At time of visit, she was given the oncology distress screen, and scored positive on that screen. This clinician attempted to reach Le by phone today, but was unable to reach due to mailbox being full. SW sent [employer] message introducing role of  and support available.     Follow-up Required:   Le does not have an oncology diagnosis, and will therefore not be followed in an ongoing way by oncology social work. This clinician will help with resource support and await return call. No planned outreach at present time.     SERGE Mcgill, LICSW  Phone: [phone]  [facility]: M, Thu  *every other Tue, 8am-4:30pm  [facility]: W, F, *every other Tue,  8am-4:30pm

## 2021-11-07 ENCOUNTER — HEALTH MAINTENANCE LETTER (OUTPATIENT)
Age: 61
End: 2021-11-07

## 2022-01-14 ENCOUNTER — E-VISIT (OUTPATIENT)
Dept: URGENT CARE | Facility: CLINIC | Age: 62
End: 2022-01-14
Payer: MEDICARE

## 2022-01-14 DIAGNOSIS — Z20.822 SUSPECTED COVID-19 VIRUS INFECTION: Primary | ICD-10-CM

## 2022-01-14 PROCEDURE — 99421 OL DIG E/M SVC 5-10 MIN: CPT | Performed by: EMERGENCY MEDICINE

## 2022-01-14 NOTE — PATIENT INSTRUCTIONS
Le,      Based on your responses, you may have coronavirus (COVID-19). This illness can cause fever, cough and trouble breathing. Many people get a mild case and get better on their own. Some people can get very sick.    Will I be tested for COVID-19?  We would like to test you for COVID-19 virus. I have placed orders for this test.     To schedule: go to your [email or web address] home page and scroll down to the section that says  You have an appointment that needs to be scheduled  and click the large green button that says  Schedule Now  and follow the steps to find the next available openings.    If you are unable to complete these [email or web address] scheduling steps, please call [phone] to schedule your testing.     Return to work/school/ guidance:  Please let your workplace manager and staffing office know when your isolation ends.       If you receive a positive COVID-19 test result, follow the guidance of the those who are giving you the results. Usually the return to work is 10 days from symptom onset or positive test date, (or in some cases 20 days if you are immunocompromised). If your symptoms started after your positive test, the 10 days should start when your symptoms started.   o If you work at [employer], you must also be cleared by Employee Occupational Health and Safety to return to work.      If you receive a negative COVID-19 test result and did not have a high risk exposure to someone with a known positive COVID-19 test, you can return to work once you're free of fever for 24 hours without fever-reducing medication and your symptoms are improving or resolved.    If you receive a negative COVID-19 test and had a high-risk exposure to someone who has tested positive for COVID-19 then you can return to work 14 days after your last contact with the positive individual. Follow quarantine guidance given by your doctor or public health officials.     Sign up for GetWell Loop:  We know it's scary to  hear that you might have COVID-19. We want to track your symptoms to make sure you're okay over the next 2 weeks. Please look for an email from [employer]--this is a free, online program that we'll use to keep in touch. To sign up, follow the link in the email you will receive. Learn more at [email or web address]    How can I take care of myself?  Over the counter medications may help with your symptoms like congestion, cough, chills, or fever.   There are not many effective prescription treatments for early COVID-19. Hydroxychloroquine, ivermectin, and azithromycin are not effective or recommended for COVID-19.    If your symptoms started in the last 10 days, you may be able to receive a treatment with monoclonal antibodies. This treatment can lower your risk of severe illness and going to the hospital. It is given through an IV or under your skin (subcutaneous) and must be given at an infusion center. You must be 12 or older, weight at least 88 pounds, and have a positive COVID-19 test.     If you would like to sign up to be considered to receive the monoclonal antibody medicine, please complete a participation form through the [facility] here: MNRAP ([email or web address]). You may also call the [facility] COVID-19 Public Hotline at 1-499.491.3442 (open Mon-Fri: 9am-7pm and Sat: 10am-6pm).     Not all people who are eligible will receive the medicine, since supply is limited. You will be contacted in the next 1 to 2 business days only if you are selected. If you do not receive a call, you have not been selected to receive the medicine. If you have any questions about this medication, please contact your primary care provider. For more information, see [email or web address]/[email or web address]      Get lots of rest. Drink extra fluids (unless a doctor has told you not to)    Take Tylenol (acetaminophen) or ibuprofen for fever or  pain. If you have liver or kidney problems, ask your family doctor if it's okay to take Tylenol o ibuprofen    Take over the counter medications for your symptoms, as directed by your doctor. You may also talk to your pharmacist.      If you have other health problems (like cancer, heart failure, an organ transplant or severe kidney disease): Call your specialty clinic if you don't feel better in the next 2 days.    Know when to call 911. Emergency warning signs include:  o Trouble breathing or shortness of breath  o Pain or pressure in the chest that doesn't go away  o Feeling confused like you haven't felt before, or not being able to wake up  o Bluish-colored lips or face    Where can I get more information?     [facility] - About COVID-19: [email or web address]     CDC - What to Do If You're Sick:     www.cdc.gov/coronavirus/2019-ncov/about/steps-when-sick.html    CDC - Ending Home Isolation:  https://www.cdc.gov/coronavirus/2019-ncov/your-health/quarantine-isolation.html    CDC - Caring for Someone:  www.cdc.gov/coronavirus/2019-ncov/if-you-are-sick/care-for-someone.html    [facility] clinical trials (COVID-19 research studies): clinicalaffairs.[facility]-clinical-trials    Below are the COVID-19 hotlines at the [facility] of [facility] ([facility]). Interpreters are available.  o For health questions: Call 238-781-7787 or 1-982.493.8529 (7 a.m. to 7 p.m.)  o For questions about schools and childcare: Call 673-911-6342 or 1-136.307.4449 (7 a.m. to 7 p.m.)

## 2022-01-17 ENCOUNTER — LAB (OUTPATIENT)
Dept: URGENT CARE | Facility: URGENT CARE | Age: 62
End: 2022-01-17
Attending: EMERGENCY MEDICINE
Payer: MEDICARE

## 2022-01-17 DIAGNOSIS — Z20.822 SUSPECTED COVID-19 VIRUS INFECTION: ICD-10-CM

## 2022-01-17 PROCEDURE — U0003 INFECTIOUS AGENT DETECTION BY NUCLEIC ACID (DNA OR RNA); SEVERE ACUTE RESPIRATORY SYNDROME CORONAVIRUS 2 (SARS-COV-2) (CORONAVIRUS DISEASE [COVID-19]), AMPLIFIED PROBE TECHNIQUE, MAKING USE OF HIGH THROUGHPUT TECHNOLOGIES AS DESCRIBED BY CMS-2020-01-R: HCPCS

## 2022-01-17 PROCEDURE — U0005 INFEC AGEN DETEC AMPLI PROBE: HCPCS

## 2022-01-18 LAB — SARS-COV-2 RNA RESP QL NAA+PROBE: POSITIVE

## 2022-01-19 ENCOUNTER — TELEPHONE (OUTPATIENT)
Dept: URGENT CARE | Facility: CLINIC | Age: 62
End: 2022-01-19
Payer: MEDICARE

## 2022-01-19 NOTE — TELEPHONE ENCOUNTER
Patient identified as eligible for COVID treatment? Yes    Coronavirus (COVID-19) Notification    Reason for call  Notify of POSITIVE COVID-19 lab result, assess symptoms,  review [facility] recommendations    Lab Result   Lab test for 2019-nCoV rRt-PCR or SARS-COV-2 PCR  Oropharyngeal AND/OR nasopharyngeal swabs were POSITIVE for 2019-nCoV RNA [OR] SARS-COV-2 RNA (COVID-19) RNA     We have been unable to reach patient by phone at this time to notify of their Positive COVID-19 result.    Left voicemail message requesting a call back to [phone] [facility] for results.        A Positive COVID-19 letter will be sent via [email or web address] or the mail. (Exception, no letters sent to Presurgerical/Preprocedure Patients)    Akanksha Noble LPN

## 2022-08-25 ENCOUNTER — OFFICE VISIT (OUTPATIENT)
Dept: URGENT CARE | Facility: URGENT CARE | Age: 62
End: 2022-08-25
Payer: MEDICARE

## 2022-08-25 VITALS
HEART RATE: 79 BPM | BODY MASS INDEX: 47.09 KG/M2 | OXYGEN SATURATION: 97 % | HEIGHT: 66 IN | TEMPERATURE: 98.3 F | DIASTOLIC BLOOD PRESSURE: 75 MMHG | WEIGHT: 293 LBS | SYSTOLIC BLOOD PRESSURE: 134 MMHG

## 2022-08-25 DIAGNOSIS — H10.502 BLEPHAROCONJUNCTIVITIS OF LEFT EYE, UNSPECIFIED BLEPHAROCONJUNCTIVITIS TYPE: Primary | ICD-10-CM

## 2022-08-25 PROCEDURE — 99213 OFFICE O/P EST LOW 20 MIN: CPT | Performed by: EMERGENCY MEDICINE

## 2022-08-25 RX ORDER — OFLOXACIN 3 MG/ML
1-2 SOLUTION/ DROPS OPHTHALMIC 4 TIMES DAILY
Qty: 5 ML | Refills: 0 | Status: SHIPPED | OUTPATIENT
Start: 2022-08-25 | End: 2022-09-01

## 2022-08-25 NOTE — PROGRESS NOTES
Assessment & Plan     Diagnosis:    (H10.502) Blepharoconjunctivitis of left eye, unspecified blepharoconjunctivitis type  (primary encounter diagnosis)  Plan: ofloxacin (OCUFLOX) 0.3 % ophthalmic solution      Medical Decision Making  Le Oconnor is a 62 year old female who presents for evaluation of a red eye and swollen eyelid.  A broad differential diagnosis was considered including bacterial conjunctivitis, viral conjunctivitis, foreign body, corneal abrasion, chemical vs allergic conjunctivitis, corneal ulcer, HSV, herpes zoster ophthalmicus, orbital cellulitis, etc.  Signs and symptoms consistent with blepharoconjunctivitis, likely bacterial given mattering at medial canthus. She notes no vision changes. Will start antibiotics and have close follow-up of eye physician.  No red flag symptoms to suggest any of the above worrisome etiologies.      Patient voices understanding and agreement with the plan including reasons to go to the ER immediately as well as to be seen by a more consistent care-giver, such as their PCP, if the symptoms persist more than 3 days.       Chuck Prescott PA-C  [facility] URGENT CARE    Subjective     Le Oconnor is a 62 year old female who presents to clinic today for the following health issues:  Chief Complaint   Patient presents with     Swelling Around Left Eye     Eye Discharge Left Eye       HPI    Eye Problem    Onset of symptoms was 1 day ago.   Location: left eye   Course of illness is worsening.    Severity mild  Current and Associated symptoms: mattering, redness, eyelid redness, swelling and a feeling of a lump at the medial canthus.  Treatment measures tried include none  Context: None    Patient denies any injury to the eye, eye pain, vision changes, headache, fever, ear pain, neck pain or stiffness, bleeding or significant discharge from the eye.     Review of Systems    See HPI    Objective      Vitals: /75 (BP Location: Left arm,  "Patient Position: Sitting, Cuff Size: Adult Large)   Pulse 79   Temp 98.3  F (36.8  C) (Tympanic)   Ht 1.676 m (5' 6\")   Wt (!) 186 kg (410 lb)   SpO2 97%   BMI 66.18 kg/m    Resp:     Patient Vitals for the past 24 hrs:   BP Temp Temp src Pulse SpO2 Height Weight   08/25/22 1122 134/75 98.3  F (36.8  C) Tympanic 79 97 % 1.676 m (5' 6\") (!) 186 kg (410 lb)       Vital signs reviewed by: Chuck Prescott PA-C    Physical Exam   Constitutional: Patient is alert. No acute distress.  Ears: Right TM is clear. Left TM is clear. External ear canals are normal.  Eyes: Conjunctivae is injected on the left; redness does not cross the limbus. There is mattering/discharge and swelling noted at the upper eyelid margins and at the medial canthus. EOMI are normal. PERRL. Visual acuity is intact.   Neurological: Alert. CN 2-7 intact.        Chuck Prescott PA-C, August 25, 2022      "

## 2022-08-25 NOTE — LETTER
[address] [address]          August 25, 2022    RE:  Le Oconnor                                                                                                                                                       [address] [facility] [address]            To whom it may concern:    Le Oconnor is under my professional care for Blepharoconjunctivitis of left eye, unspecified blepharoconjunctivitis type She  may return to work with the following: The employee is UNABLE to return to work until 8/26/2022      Sincerely,        Chuck Prescott PA-C

## 2022-09-27 DIAGNOSIS — R91.1 LUNG NODULE: Primary | ICD-10-CM

## 2022-10-30 ENCOUNTER — HEALTH MAINTENANCE LETTER (OUTPATIENT)
Age: 62
End: 2022-10-30

## 2022-12-17 ENCOUNTER — HEALTH MAINTENANCE LETTER (OUTPATIENT)
Age: 62
End: 2022-12-17

## 2023-03-13 ENCOUNTER — OFFICE VISIT (OUTPATIENT)
Dept: URGENT CARE | Facility: URGENT CARE | Age: 63
End: 2023-03-13
Payer: MEDICARE

## 2023-03-13 VITALS
DIASTOLIC BLOOD PRESSURE: 93 MMHG | OXYGEN SATURATION: 98 % | SYSTOLIC BLOOD PRESSURE: 168 MMHG | TEMPERATURE: 97.2 F | WEIGHT: 293 LBS | BODY MASS INDEX: 69.11 KG/M2 | HEART RATE: 94 BPM

## 2023-03-13 DIAGNOSIS — L85.3 DRY SKIN: ICD-10-CM

## 2023-03-13 DIAGNOSIS — R60.0 BILATERAL LEG EDEMA: ICD-10-CM

## 2023-03-13 DIAGNOSIS — Z20.818 EXPOSURE TO STREP THROAT: Primary | ICD-10-CM

## 2023-03-13 DIAGNOSIS — I10 ESSENTIAL HYPERTENSION: ICD-10-CM

## 2023-03-13 LAB — DEPRECATED S PYO AG THROAT QL EIA: NEGATIVE

## 2023-03-13 PROCEDURE — 99213 OFFICE O/P EST LOW 20 MIN: CPT | Performed by: PHYSICIAN ASSISTANT

## 2023-03-13 PROCEDURE — 87651 STREP A DNA AMP PROBE: CPT | Performed by: PHYSICIAN ASSISTANT

## 2023-03-13 RX ORDER — TOPIRAMATE 25 MG/1
TABLET, FILM COATED ORAL
COMMUNITY
Start: 2023-03-09

## 2023-03-13 ASSESSMENT — ENCOUNTER SYMPTOMS
DIZZINESS: 0
MYALGIAS: 0
HEADACHES: 0
RESPIRATORY NEGATIVE: 1
LIGHT-HEADEDNESS: 0
SORE THROAT: 1
COUGH: 0
SHORTNESS OF BREATH: 0
JOINT SWELLING: 0
RHINORRHEA: 0
NECK STIFFNESS: 0
FEVER: 0
DIARRHEA: 0
WEAKNESS: 0
CARDIOVASCULAR NEGATIVE: 1
ALLERGIC/IMMUNOLOGIC NEGATIVE: 1
ARTHRALGIAS: 0
NECK PAIN: 0
NAUSEA: 0
EYES NEGATIVE: 1
WOUND: 0
CHILLS: 0
BACK PAIN: 0
VOMITING: 0
PALPITATIONS: 0
ENDOCRINE NEGATIVE: 1
MUSCULOSKELETAL NEGATIVE: 1

## 2023-03-14 LAB — GROUP A STREP BY PCR: NOT DETECTED

## 2023-03-14 NOTE — PROGRESS NOTES
Chief Complaint:     Chief Complaint   Patient presents with     Urgent Care     Rash     Lower legs for weeks, itching     Throat Problem     exposure to strep throat-want strep test       Results for orders placed or performed in visit on 03/13/23   Streptococcus A Rapid Screen w/Reflex to PCR - Clinic Collect     Status: Normal    Specimen: Throat; Swab   Result Value Ref Range    Group A Strep antigen Negative Negative       Medical Decision Making:    Vital signs reviewed by Al Kaplan PA-C  BP (!) 177/83 (BP Location: Left arm, Patient Position: Sitting, Cuff Size: Adult Large)   Pulse 94   Temp 97.2  F (36.2  C) (Tympanic)   Wt (!) 194.2 kg (428 lb 3.2 oz)   SpO2 98%   BMI 69.11 kg/m      Differential Diagnosis:  URI Adult/Peds:  Strep pharyngitis, Tonsilitis, Viral pharyngitis, Viral syndrome and Viral upper respiratory illness        ASSESSMENT    1. Exposure to strep throat    2. Essential hypertension    3. Bilateral leg edema    4. Dry skin        PLAN    Patient is in no acute distress.    Temp is 97.2 in clinic today, lung sounds were clear, and O2 sats at 98% on RA.    RST was negative.  We will call with PCR results only if positive.  Rest, Push fluids, vaporizer.  Ibuprofen and or Tylenol for any fever or body aches.  Elevate the legs 2 times per day.  Start Cerave Hydrating cream on the legs.    Patient is hypertensive in clinic today.  Second BP reading was also above goal at 168/93.  Continue taking your Losartan.  Patient instructed to follow up with PCP in the next week for BP recheck.  Sooner if symptoms worsen.  Worrisome symptoms discussed with instructions to go to the ED.  Patient verbalized understanding and agreed with this plan.    Labs:    Results for orders placed or performed in visit on 03/13/23   Streptococcus A Rapid Screen w/Reflex to PCR - Clinic Collect     Status: Normal    Specimen: Throat; Swab   Result Value Ref Range    Group A Strep antigen Negative Negative         Vital signs reviewed by Al Kaplan PA-C  BP (!) 177/83 (BP Location: Left arm, Patient Position: Sitting, Cuff Size: Adult Large)   Pulse 94   Temp 97.2  F (36.2  C) (Tympanic)   Wt (!) 194.2 kg (428 lb 3.2 oz)   SpO2 98%   BMI 69.11 kg/m      Current Meds      Current Outpatient Medications:      ALBUTEROL IN, , Disp: , Rfl:      fluticasone (FLONASE) 50 MCG/ACT nasal spray, Spray 1 spray into both nostrils daily, Disp: , Rfl:      IBUPROFEN PO, Take 600 mg by mouth every 6 hours, Disp: , Rfl:      losartan (COZAAR) 25 MG tablet, , Disp: , Rfl:      topiramate (TOPAMAX) 25 MG tablet, TAKE 1 TABLET BY MOUTH AT BEDTIME X7 DAYS AND INCREASE BY 1 TABLET EVERY WEEK UNTIL TAKING 2 TABLETS BY MOUTH TWICE DAILY, Disp: , Rfl:       Respiratory History    occasional episodes of bronchitis      SUBJECTIVE    HPI: Le Oconnor is an 62 year old female who presents with sore throat.  Symptoms began 1  days ago and has unchanged.  There is no shortness of breath, wheezing and chest pain.  Patient is eating and drinking well.  No fever, nausea, vomiting, or diarrhea.    Patient denies any recent travel or exposure to known COVID positive tested individual.  Patient was exposed to strep and would like a strep test.    She is also complaining of rash on the bilateral lower legs for several weeks or more.      ROS:     Review of Systems   Constitutional: Negative for chills and fever.   HENT: Positive for sore throat. Negative for congestion, ear pain and rhinorrhea.    Eyes: Negative.    Respiratory: Negative.  Negative for cough and shortness of breath.    Cardiovascular: Negative.  Negative for chest pain and palpitations.   Gastrointestinal: Negative for diarrhea, nausea and vomiting.   Endocrine: Negative.    Genitourinary: Negative.    Musculoskeletal: Negative.  Negative for arthralgias, back pain, joint swelling, myalgias, neck pain and neck stiffness.   Skin: Positive for rash. Negative for wound.    Allergic/Immunologic: Negative.  Negative for immunocompromised state.   Neurological: Negative for dizziness, weakness, light-headedness and headaches.         Family History   History reviewed. No pertinent family history.     Problem history  Patient Active Problem List   Diagnosis     Depression     Essential hypertension     History of gastric bypass     Mild intermittent asthma     Morbid obesity (H)        Allergies  Allergies   Allergen Reactions     Codeine      Erythromycin      Morphine      Orange Juice [Orange Oil]      Tylenol [Acetaminophen]         Social History  Social History     Socioeconomic History     Marital status: Single     Spouse name: Not on file     Number of children: Not on file     Years of education: Not on file     Highest education level: Not on file   Occupational History     Not on file   Tobacco Use     Smoking status: Former     Packs/day: 0.50     Years: 12.00     Pack years: 6.00     Types: Cigarettes     Smokeless tobacco: Never     Tobacco comments:     quit 30 years ago   Vaping Use     Vaping Use: Not on file   Substance and Sexual Activity     Alcohol use: Yes     Comment: rare     Drug use: No     Comment: when much younger david.     Sexual activity: Not on file   Other Topics Concern     Not on file   Social History Narrative     Not on file     Social Determinants of Health     Financial Resource Strain: Not on file   Food Insecurity: Not on file   Transportation Needs: Not on file   Physical Activity: Not on file   Stress: Not on file   Social Connections: Not on file   Intimate Partner Violence: Not on file   Housing Stability: Not on file        OBJECTIVE     Vital signs reviewed by Al Kaplan PA-C  BP (!) 177/83 (BP Location: Left arm, Patient Position: Sitting, Cuff Size: Adult Large)   Pulse 94   Temp 97.2  F (36.2  C) (Tympanic)   Wt (!) 194.2 kg (428 lb 3.2 oz)   SpO2 98%   BMI 69.11 kg/m       Physical Exam  Vitals and nursing note reviewed.    Constitutional:       General: She is not in acute distress.     Appearance: She is well-developed. She is not ill-appearing, toxic-appearing or diaphoretic.   HENT:      Head: Normocephalic and atraumatic.      Right Ear: Hearing, tympanic membrane, ear canal and external ear normal. Tympanic membrane is not perforated, erythematous, retracted or bulging.      Left Ear: Hearing, tympanic membrane, ear canal and external ear normal. Tympanic membrane is not perforated, erythematous, retracted or bulging.      Nose: Congestion present. No mucosal edema or rhinorrhea.      Right Sinus: No maxillary sinus tenderness or frontal sinus tenderness.      Left Sinus: No maxillary sinus tenderness or frontal sinus tenderness.      Mouth/Throat:      Pharynx: Posterior oropharyngeal erythema present. No pharyngeal swelling, oropharyngeal exudate or uvula swelling.      Tonsils: No tonsillar exudate or tonsillar abscesses. 0 on the right. 0 on the left.   Eyes:      General:         Right eye: No discharge.         Left eye: No discharge.      Pupils: Pupils are equal, round, and reactive to light.   Cardiovascular:      Rate and Rhythm: Normal rate and regular rhythm.      Heart sounds: Normal heart sounds. No murmur heard.    No friction rub. No gallop.   Pulmonary:      Effort: Pulmonary effort is normal. No respiratory distress.      Breath sounds: Normal breath sounds. No decreased breath sounds, wheezing, rhonchi or rales.   Chest:      Chest wall: No tenderness.   Abdominal:      General: Bowel sounds are normal. There is no distension.      Palpations: Abdomen is soft. There is no mass.      Tenderness: There is no abdominal tenderness. There is no guarding.   Musculoskeletal:      Cervical back: Normal range of motion and neck supple.      Right lower le+ Pitting Edema present.      Left lower le+ Pitting Edema present.   Lymphadenopathy:      Head:      Right side of head: No submental, submandibular,  tonsillar, preauricular or posterior auricular adenopathy.      Left side of head: No submental, submandibular, tonsillar, preauricular or posterior auricular adenopathy.      Cervical: No cervical adenopathy.      Right cervical: No superficial or posterior cervical adenopathy.     Left cervical: No superficial or posterior cervical adenopathy.   Skin:     General: Skin is warm and dry.      Findings: No rash.      Comments: Bilateral lower legs have dry appearance with some scaling.     Neurological:      Mental Status: She is alert and oriented to person, place, and time.      Cranial Nerves: No cranial nerve deficit.      Deep Tendon Reflexes: Reflexes are normal and symmetric.   Psychiatric:         Behavior: Behavior normal. Behavior is cooperative.         Thought Content: Thought content normal.         Judgment: Judgment normal.           Al Kaplan PA-C  3/13/2023, 7:01 PM

## 2023-03-14 NOTE — PATIENT INSTRUCTIONS
Elevate the legs above the level of your heart 2 times per day for 15 minutes.    Start Cerave hydrating cream on the legs and hands.

## 2023-07-01 ENCOUNTER — HEALTH MAINTENANCE LETTER (OUTPATIENT)
Age: 63
End: 2023-07-01

## 2023-11-18 ENCOUNTER — HEALTH MAINTENANCE LETTER (OUTPATIENT)
Age: 63
End: 2023-11-18

## 2024-02-28 ENCOUNTER — OFFICE VISIT (OUTPATIENT)
Dept: URGENT CARE | Facility: URGENT CARE | Age: 64
End: 2024-02-28
Payer: MEDICARE

## 2024-02-28 VITALS
DIASTOLIC BLOOD PRESSURE: 80 MMHG | HEART RATE: 111 BPM | OXYGEN SATURATION: 97 % | SYSTOLIC BLOOD PRESSURE: 155 MMHG | TEMPERATURE: 98.5 F | BODY MASS INDEX: 64.9 KG/M2 | WEIGHT: 293 LBS | RESPIRATION RATE: 20 BRPM

## 2024-02-28 DIAGNOSIS — R50.9 FEVER, UNSPECIFIED FEVER CAUSE: ICD-10-CM

## 2024-02-28 DIAGNOSIS — J02.0 STREP THROAT: Primary | ICD-10-CM

## 2024-02-28 LAB
DEPRECATED S PYO AG THROAT QL EIA: POSITIVE
FLUAV AG SPEC QL IA: NEGATIVE
FLUBV AG SPEC QL IA: NEGATIVE

## 2024-02-28 PROCEDURE — 87880 STREP A ASSAY W/OPTIC: CPT | Performed by: PHYSICIAN ASSISTANT

## 2024-02-28 PROCEDURE — 99214 OFFICE O/P EST MOD 30 MIN: CPT | Performed by: PHYSICIAN ASSISTANT

## 2024-02-28 PROCEDURE — 87635 SARS-COV-2 COVID-19 AMP PRB: CPT | Performed by: PHYSICIAN ASSISTANT

## 2024-02-28 PROCEDURE — 87804 INFLUENZA ASSAY W/OPTIC: CPT | Performed by: PHYSICIAN ASSISTANT

## 2024-02-28 RX ORDER — PENICILLIN V POTASSIUM 500 MG/1
500 TABLET, FILM COATED ORAL 2 TIMES DAILY
Qty: 20 TABLET | Refills: 0 | Status: SHIPPED | OUTPATIENT
Start: 2024-02-28 | End: 2024-03-09

## 2024-02-28 ASSESSMENT — ENCOUNTER SYMPTOMS
BACK PAIN: 0
ALLERGIC/IMMUNOLOGIC NEGATIVE: 1
HEADACHES: 1
LIGHT-HEADEDNESS: 0
SORE THROAT: 1
NECK STIFFNESS: 0
JOINT SWELLING: 0
WEAKNESS: 0
DIARRHEA: 0
RHINORRHEA: 0
ARTHRALGIAS: 0
VOMITING: 0
NAUSEA: 0
MYALGIAS: 1
CHILLS: 0
CARDIOVASCULAR NEGATIVE: 1
FEVER: 1
NECK PAIN: 0
ENDOCRINE NEGATIVE: 1
SHORTNESS OF BREATH: 0
EYES NEGATIVE: 1
DIZZINESS: 0
COUGH: 1
PALPITATIONS: 0
WOUND: 0

## 2024-02-28 NOTE — PROGRESS NOTES
Chief Complaint:     Chief Complaint   Patient presents with    Pharyngitis     Sx onset- Monday     Headache    Fever    Ear Problem     Bilateral ear piain       Results for orders placed or performed in visit on 02/28/24   Streptococcus A Rapid Screen w/Reflex to PCR - Clinic Collect     Status: Abnormal    Specimen: Throat; Swab   Result Value Ref Range    Group A Strep antigen Positive (A) Negative   Influenza A & B Antigen - Clinic Collect     Status: Normal    Specimen: Nose; Swab   Result Value Ref Range    Influenza A antigen Negative Negative    Influenza B antigen Negative Negative    Narrative    Test results must be correlated with clinical data. If necessary, results should be confirmed by a molecular assay or viral culture.       Medical Decision Making:    Vital signs reviewed by Al Kaplan PA-C  BP (!) 155/80 (BP Location: Left arm, Patient Position: Sitting, Cuff Size: Adult Regular)   Pulse 111   Temp 98.5  F (36.9  C) (Oral)   Resp 20   Wt (!) 182.4 kg (402 lb 1.6 oz)   SpO2 97%   BMI 64.90 kg/m      Differential Diagnosis:  URI Adult/Peds:  Acute right otitis media, Acute left otitis media, Bronchitis-viral, Influenza, Pneumonia, Sinusitis, Strep pharyngitis, Tonsilitis, Viral pharyngitis, Viral syndrome, and Viral upper respiratory illness        ASSESSMENT    1. Strep throat    2. Fever, unspecified fever cause        PLAN    Patient is in no acute distress.    Temp is 98.5 in clinic today, lung sounds were clear, and O2 sats at 97% on RA.    RST was positive.  Rx for Penicillin sent in.  Influenza was negative.  COVID swab collected in clinic today.  Rest, Push fluids, vaporizer, elevation of head of bed.  Ibuprofen and or Tylenol for any fever or body aches.  Over the counter cough suppressant- PRN- as discussed.   If symptoms worsen, recheck immediately otherwise follow up with your PCP in 1 week if symptoms are not improving.  Worrisome symptoms discussed with instructions to  go to the ED.  Patient verbalized understanding and agreed with this plan.    Labs:    Results for orders placed or performed in visit on 02/28/24   Streptococcus A Rapid Screen w/Reflex to PCR - Clinic Collect     Status: Abnormal    Specimen: Throat; Swab   Result Value Ref Range    Group A Strep antigen Positive (A) Negative   Influenza A & B Antigen - Clinic Collect     Status: Normal    Specimen: Nose; Swab   Result Value Ref Range    Influenza A antigen Negative Negative    Influenza B antigen Negative Negative    Narrative    Test results must be correlated with clinical data. If necessary, results should be confirmed by a molecular assay or viral culture.        Vital signs reviewed by Al Kaplan PA-C  BP (!) 155/80 (BP Location: Left arm, Patient Position: Sitting, Cuff Size: Adult Regular)   Pulse 111   Temp 98.5  F (36.9  C) (Oral)   Resp 20   Wt (!) 182.4 kg (402 lb 1.6 oz)   SpO2 97%   BMI 64.90 kg/m      Current Meds      Current Outpatient Medications:     ALBUTEROL IN, , Disp: , Rfl:     fluticasone (FLONASE) 50 MCG/ACT nasal spray, Spray 1 spray into both nostrils daily, Disp: , Rfl:     IBUPROFEN PO, Take 600 mg by mouth every 6 hours, Disp: , Rfl:     losartan (COZAAR) 25 MG tablet, , Disp: , Rfl:     penicillin V (VEETID) 500 MG tablet, Take 1 tablet (500 mg) by mouth 2 times daily for 10 days, Disp: 20 tablet, Rfl: 0    topiramate (TOPAMAX) 25 MG tablet, TAKE 1 TABLET BY MOUTH AT BEDTIME X7 DAYS AND INCREASE BY 1 TABLET EVERY WEEK UNTIL TAKING 2 TABLETS BY MOUTH TWICE DAILY, Disp: , Rfl:       Respiratory History    occasional episodes of bronchitis      SUBJECTIVE    HPI: Le Oconnor is an 63 year old female who presents with ear pain bilateral, fever, headache, and sore throat.  Symptoms began 2  days ago and has unchanged.  There is no shortness of breath, wheezing, and chest pain.  Patient is eating and drinking well.  No fever, nausea, vomiting, or  diarrhea.    Patient denies any recent travel or exposure to known COVID positive tested individual.      ROS:     Review of Systems   Constitutional:  Positive for fever. Negative for chills.   HENT:  Positive for congestion and sore throat. Negative for ear pain and rhinorrhea.    Eyes: Negative.    Respiratory:  Positive for cough. Negative for shortness of breath.    Cardiovascular: Negative.  Negative for chest pain and palpitations.   Gastrointestinal:  Negative for diarrhea, nausea and vomiting.   Endocrine: Negative.    Genitourinary: Negative.    Musculoskeletal:  Positive for myalgias. Negative for arthralgias, back pain, joint swelling, neck pain and neck stiffness.   Skin: Negative.  Negative for rash and wound.   Allergic/Immunologic: Negative.  Negative for immunocompromised state.   Neurological:  Positive for headaches. Negative for dizziness, weakness and light-headedness.         Family History   No family history on file.     Problem history  Patient Active Problem List   Diagnosis    Depression    Essential hypertension    History of gastric bypass    Mild intermittent asthma    Morbid obesity (H)        Allergies  Allergies   Allergen Reactions    Codeine     Erythromycin     Morphine     Orange Juice [Orange Oil]     Tylenol [Acetaminophen]         Social History  Social History     Socioeconomic History    Marital status: Single     Spouse name: Not on file    Number of children: Not on file    Years of education: Not on file    Highest education level: Not on file   Occupational History    Not on file   Tobacco Use    Smoking status: Former     Packs/day: 0.50     Years: 12.00     Additional pack years: 0.00     Total pack years: 6.00     Types: Cigarettes    Smokeless tobacco: Never    Tobacco comments:     quit 30 years ago   Vaping Use    Vaping Use: Not on file   Substance and Sexual Activity    Alcohol use: Yes     Comment: rare    Drug use: No     Comment: when much younger david.     Sexual activity: Not on file   Other Topics Concern    Not on file   Social History Narrative    Not on file     Social Determinants of Health     Financial Resource Strain: Not on file   Food Insecurity: Not on file   Transportation Needs: Not on file   Physical Activity: Not on file   Stress: Not on file   Social Connections: Not on file   Interpersonal Safety: Not on file   Housing Stability: Not on file        OBJECTIVE     Vital signs reviewed by Al Kaplan PA-C  BP (!) 155/80 (BP Location: Left arm, Patient Position: Sitting, Cuff Size: Adult Regular)   Pulse 111   Temp 98.5  F (36.9  C) (Oral)   Resp 20   Wt (!) 182.4 kg (402 lb 1.6 oz)   SpO2 97%   BMI 64.90 kg/m       Physical Exam  Vitals and nursing note reviewed.   Constitutional:       General: She is not in acute distress.     Appearance: She is well-developed. She is not ill-appearing, toxic-appearing or diaphoretic.   HENT:      Head: Normocephalic and atraumatic.      Right Ear: Hearing, tympanic membrane, ear canal and external ear normal. Tympanic membrane is not perforated, erythematous, retracted or bulging.      Left Ear: Hearing, tympanic membrane, ear canal and external ear normal. Tympanic membrane is not perforated, erythematous, retracted or bulging.      Nose: Congestion present. No mucosal edema or rhinorrhea.      Right Sinus: No maxillary sinus tenderness or frontal sinus tenderness.      Left Sinus: No maxillary sinus tenderness or frontal sinus tenderness.      Mouth/Throat:      Pharynx: Posterior oropharyngeal erythema present. No pharyngeal swelling, oropharyngeal exudate or uvula swelling.      Tonsils: No tonsillar exudate or tonsillar abscesses. 0 on the right. 0 on the left.   Eyes:      General:         Right eye: No discharge.         Left eye: No discharge.      Pupils: Pupils are equal, round, and reactive to light.   Cardiovascular:      Rate and Rhythm: Normal rate and regular rhythm.      Heart sounds:  Normal heart sounds. No murmur heard.     No friction rub. No gallop.   Pulmonary:      Effort: Pulmonary effort is normal. No respiratory distress.      Breath sounds: Normal breath sounds. No decreased breath sounds, wheezing, rhonchi or rales.   Chest:      Chest wall: No tenderness.   Abdominal:      General: Bowel sounds are normal. There is no distension.      Palpations: Abdomen is soft. There is no mass.      Tenderness: There is no abdominal tenderness. There is no guarding.   Musculoskeletal:      Cervical back: Normal range of motion and neck supple.   Lymphadenopathy:      Head:      Right side of head: No submental, submandibular, tonsillar, preauricular or posterior auricular adenopathy.      Left side of head: No submental, submandibular, tonsillar, preauricular or posterior auricular adenopathy.      Cervical: No cervical adenopathy.      Right cervical: No superficial or posterior cervical adenopathy.     Left cervical: No superficial or posterior cervical adenopathy.   Skin:     General: Skin is warm and dry.      Findings: No rash.   Neurological:      Mental Status: She is alert and oriented to person, place, and time.      Cranial Nerves: No cranial nerve deficit.      Deep Tendon Reflexes: Reflexes are normal and symmetric.   Psychiatric:         Behavior: Behavior normal. Behavior is cooperative.         Thought Content: Thought content normal.         Judgment: Judgment normal.           Al Kaplan PA-C  2/28/2024, 5:04 PM

## 2024-02-29 LAB — SARS-COV-2 RNA RESP QL NAA+PROBE: NEGATIVE

## 2024-08-24 ENCOUNTER — HEALTH MAINTENANCE LETTER (OUTPATIENT)
Age: 64
End: 2024-08-24

## 2024-10-10 NOTE — ADDENDUM NOTE
Addended by: NOEMY LANDA on: 10/25/2021 04:59 PM     Modules accepted: Orders     Flu Immunization given with patients verbal consent. Patient tolerated without incident. See immunization tab for further documentation.

## 2025-05-03 ENCOUNTER — ANCILLARY PROCEDURE (OUTPATIENT)
Dept: GENERAL RADIOLOGY | Facility: CLINIC | Age: 65
End: 2025-05-03
Attending: PHYSICIAN ASSISTANT
Payer: MEDICARE

## 2025-05-03 ENCOUNTER — OFFICE VISIT (OUTPATIENT)
Dept: URGENT CARE | Facility: URGENT CARE | Age: 65
End: 2025-05-03
Payer: MEDICARE

## 2025-05-03 VITALS
OXYGEN SATURATION: 96 % | SYSTOLIC BLOOD PRESSURE: 109 MMHG | RESPIRATION RATE: 24 BRPM | DIASTOLIC BLOOD PRESSURE: 73 MMHG | WEIGHT: 293 LBS | TEMPERATURE: 97.7 F | HEART RATE: 125 BPM | BODY MASS INDEX: 47.09 KG/M2 | HEIGHT: 66 IN

## 2025-05-03 DIAGNOSIS — R06.02 SOB (SHORTNESS OF BREATH): ICD-10-CM

## 2025-05-03 DIAGNOSIS — R50.9 FEVER, UNSPECIFIED: ICD-10-CM

## 2025-05-03 DIAGNOSIS — J45.21 MILD INTERMITTENT ASTHMA WITH ACUTE EXACERBATION: Primary | ICD-10-CM

## 2025-05-03 LAB
DEPRECATED S PYO AG THROAT QL EIA: NEGATIVE
FLUAV AG SPEC QL IA: NEGATIVE
FLUBV AG SPEC QL IA: NEGATIVE
S PYO DNA THROAT QL NAA+PROBE: NOT DETECTED

## 2025-05-03 PROCEDURE — 71046 X-RAY EXAM CHEST 2 VIEWS: CPT | Mod: TC | Performed by: RADIOLOGY

## 2025-05-03 PROCEDURE — 99215 OFFICE O/P EST HI 40 MIN: CPT | Performed by: PHYSICIAN ASSISTANT

## 2025-05-03 PROCEDURE — 87635 SARS-COV-2 COVID-19 AMP PRB: CPT | Performed by: PHYSICIAN ASSISTANT

## 2025-05-03 PROCEDURE — 87804 INFLUENZA ASSAY W/OPTIC: CPT | Performed by: PHYSICIAN ASSISTANT

## 2025-05-03 PROCEDURE — 3078F DIAST BP <80 MM HG: CPT | Performed by: PHYSICIAN ASSISTANT

## 2025-05-03 PROCEDURE — 87651 STREP A DNA AMP PROBE: CPT | Performed by: PHYSICIAN ASSISTANT

## 2025-05-03 PROCEDURE — 3074F SYST BP LT 130 MM HG: CPT | Performed by: PHYSICIAN ASSISTANT

## 2025-05-03 ASSESSMENT — ENCOUNTER SYMPTOMS
FEVER: 1
JOINT SWELLING: 0
PALPITATIONS: 0
COUGH: 1
SORE THROAT: 1
FATIGUE: 1
NAUSEA: 0
DIARRHEA: 0
RHINORRHEA: 0
ALLERGIC/IMMUNOLOGIC NEGATIVE: 1
WOUND: 0
NECK PAIN: 0
WEAKNESS: 0
CARDIOVASCULAR NEGATIVE: 1
BACK PAIN: 0
ARTHRALGIAS: 0
EYES NEGATIVE: 1
MYALGIAS: 0
ENDOCRINE NEGATIVE: 1
VOMITING: 1
DIZZINESS: 0
HEADACHES: 0
MUSCULOSKELETAL NEGATIVE: 1
SHORTNESS OF BREATH: 1
NECK STIFFNESS: 0
CHILLS: 1
LIGHT-HEADEDNESS: 0

## 2025-05-03 NOTE — PROGRESS NOTES
"Chief Complaint:     Chief Complaint   Patient presents with    Urgent Care    URI     Per patient symptoms started this Tuesday having cough, sore throat, fever, dizziness, chills, sweats, SOB, fatigued, vomiting, and constipation        Results for orders placed or performed in visit on 05/03/25   XR Chest 2 Views     Status: None    Narrative    EXAM: XR CHEST 2 VIEWS  LOCATION: [facility]  DATE: 5/3/2025    INDICATION:  SOB (shortness of breath)  COMPARISON: 5/14/2024.    FINDINGS: The heart size is normal. The thoracic aorta is calcified. Stable pleural thickening in the right hemithorax. The lungs are clear. No pneumothorax or pleural effusion.      Impression    IMPRESSION: No acute abnormality.   Results for orders placed or performed in visit on 05/03/25   Streptococcus A Rapid Screen w/Reflex to PCR - Clinic Collect     Status: Normal    Specimen: Throat; Swab   Result Value Ref Range    Group A Strep antigen Negative Negative   Influenza A/B antigen     Status: Normal    Specimen: Nose; Swab   Result Value Ref Range    Influenza A antigen Negative Negative    Influenza B antigen Negative Negative    Narrative    Test results must be correlated with clinical data. If necessary, results should be confirmed by a molecular assay or viral culture.       Medical Decision Making:    Vital signs reviewed by Al Kaplan PA-C  /73   Pulse (!) 125   Temp 97.7  F (36.5  C) (Oral)   Resp 24   Ht 1.676 m (5' 6\")   Wt (!) 176.4 kg (389 lb)   SpO2 96%   BMI 62.79 kg/m      Differential Diagnosis:  URI Adult/Peds:  Asthma exacerbation, Bronchitis-viral, Influenza, Pneumonia, Strep pharyngitis, Viral syndrome, and Viral upper respiratory illness        ASSESSMENT    1. Mild intermittent asthma with acute exacerbation    2. Fever, unspecified    3. SOB (shortness of breath)        PLAN    Patient appears ill in clinic today.    Temp is 97.7 in clinic today, lung sounds were " "diminished, and O2 sats at 96% on RA.  She is tachycardic with BP at 109/73  CXR was negative for any acute infiltrates or consolidations  per my read.    Asthma is currently not controlled.    RST was negative.  We will call with PCR results only if positive.  Influenza was negative.    COVID swab collected in clinic today.  With tachycardia and low BP, patient instructed to go to the ED now for further evaluation, further labs, and possible IV fluids.  Patient verbalized understanding and agreed with this plan.    48 minutes was spent by me on the date of the encounter doing chart review, history and exam, documentation and further activities per the note.      Labs:    Results for orders placed or performed in visit on 05/03/25   XR Chest 2 Views     Status: None    Narrative    EXAM: XR CHEST 2 VIEWS  LOCATION: [facility]  DATE: 5/3/2025    INDICATION:  SOB (shortness of breath)  COMPARISON: 5/14/2024.    FINDINGS: The heart size is normal. The thoracic aorta is calcified. Stable pleural thickening in the right hemithorax. The lungs are clear. No pneumothorax or pleural effusion.      Impression    IMPRESSION: No acute abnormality.   Results for orders placed or performed in visit on 05/03/25   Streptococcus A Rapid Screen w/Reflex to PCR - Clinic Collect     Status: Normal    Specimen: Throat; Swab   Result Value Ref Range    Group A Strep antigen Negative Negative   Influenza A/B antigen     Status: Normal    Specimen: Nose; Swab   Result Value Ref Range    Influenza A antigen Negative Negative    Influenza B antigen Negative Negative    Narrative    Test results must be correlated with clinical data. If necessary, results should be confirmed by a molecular assay or viral culture.        Vital signs reviewed by Al Kaplan PA-C  /73   Pulse (!) 125   Temp 97.7  F (36.5  C) (Oral)   Resp 24   Ht 1.676 m (5' 6\")   Wt (!) 176.4 kg (389 lb)   SpO2 96%   BMI 62.79 kg/m  "     Current Meds      Current Outpatient Medications:     ALBUTEROL IN, , Disp: , Rfl:     fluticasone (FLONASE) 50 MCG/ACT nasal spray, Spray 1 spray into both nostrils daily, Disp: , Rfl:     IBUPROFEN PO, Take 600 mg by mouth every 6 hours, Disp: , Rfl:     losartan (COZAAR) 25 MG tablet, , Disp: , Rfl:     topiramate (TOPAMAX) 25 MG tablet, TAKE 1 TABLET BY MOUTH AT BEDTIME X7 DAYS AND INCREASE BY 1 TABLET EVERY WEEK UNTIL TAKING 2 TABLETS BY MOUTH TWICE DAILY, Disp: , Rfl:       Respiratory History    occasional episodes of bronchitis, pneumonia, and asthma      SUBJECTIVE    HPI: Le Oconnor is an 64 year old female who presents with aching, chest congestion, cough nonproductive, occasional, SOB, vomiting, fever, and sore throat.  Symptoms began 4  days ago and has unchanged.  There is no wheezing, and chest pain.  Patient is eating and drinking well.  No fever, nausea, vomiting, or diarrhea.    Patient denies any recent travel or exposure to known COVID positive tested individual.      ROS:     Review of Systems   Constitutional:  Positive for chills, fatigue and fever.   HENT:  Positive for congestion and sore throat. Negative for ear pain and rhinorrhea.    Eyes: Negative.    Respiratory:  Positive for cough and shortness of breath.    Cardiovascular: Negative.  Negative for chest pain and palpitations.   Gastrointestinal:  Positive for vomiting. Negative for diarrhea and nausea.   Endocrine: Negative.    Genitourinary: Negative.    Musculoskeletal: Negative.  Negative for arthralgias, back pain, joint swelling, myalgias, neck pain and neck stiffness.   Skin: Negative.  Negative for rash and wound.   Allergic/Immunologic: Negative.  Negative for immunocompromised state.   Neurological:  Negative for dizziness, weakness, light-headedness and headaches.         Family History   No family history on file.     Problem history  Patient Active Problem List   Diagnosis    Depression    Essential  "hypertension    History of gastric bypass    Mild intermittent asthma    Morbid obesity (H)        Allergies  Allergies   Allergen Reactions    Codeine     Erythromycin     Morphine     Orange Juice [Orange Oil]     Tylenol [Acetaminophen]         Social History  Social History     Socioeconomic History    Marital status: Single     Spouse name: Not on file    Number of children: Not on file    Years of education: Not on file    Highest education level: Not on file   Occupational History    Not on file   Tobacco Use    Smoking status: Former     Current packs/day: 0.50     Average packs/day: 0.5 packs/day for 12.0 years (6.0 ttl pk-yrs)     Types: Cigarettes    Smokeless tobacco: Never    Tobacco comments:     quit 30 years ago   Vaping Use    Vaping status: Not on file   Substance and Sexual Activity    Alcohol use: Yes     Comment: rare    Drug use: No     Comment: when much younger david.    Sexual activity: Not on file   Other Topics Concern    Not on file   Social History Narrative    Not on file     Social Drivers of Health     Financial Resource Strain: Not on file   Food Insecurity: Not on file   Transportation Needs: Not on file   Physical Activity: Not on file   Stress: Not on file   Social Connections: Not on file   Interpersonal Safety: Not At Risk (1/23/2024)    [facility]     Humiliation, Afraid, Rape, and Kick questionnaire     Fear of Current or Ex-Partner: No     Emotionally Abused: No     Physically Abused: No     Sexually Abused: No   Housing Stability: Not on file        OBJECTIVE     Vital signs reviewed by Al Kaplan PA-C  /73   Pulse (!) 125   Temp 97.7  F (36.5  C) (Oral)   Resp 24   Ht 1.676 m (5' 6\")   Wt (!) 176.4 kg (389 lb)   SpO2 96%   BMI 62.79 kg/m       Physical Exam  Vitals and nursing note reviewed.   Constitutional:       General: She is not in acute distress.     Appearance: She is well-developed. She is not " ill-appearing, toxic-appearing or diaphoretic.   HENT:      Head: Normocephalic and atraumatic.      Right Ear: Hearing, tympanic membrane, ear canal and external ear normal. Tympanic membrane is not perforated, erythematous, retracted or bulging.      Left Ear: Hearing, tympanic membrane, ear canal and external ear normal. Tympanic membrane is not perforated, erythematous, retracted or bulging.      Nose: Congestion present. No mucosal edema or rhinorrhea.      Right Sinus: No maxillary sinus tenderness or frontal sinus tenderness.      Left Sinus: No maxillary sinus tenderness or frontal sinus tenderness.      Mouth/Throat:      Pharynx: No pharyngeal swelling, oropharyngeal exudate, posterior oropharyngeal erythema or uvula swelling.      Tonsils: No tonsillar exudate or tonsillar abscesses. 0 on the right. 0 on the left.   Eyes:      General:         Right eye: No discharge.         Left eye: No discharge.      Pupils: Pupils are equal, round, and reactive to light.   Cardiovascular:      Rate and Rhythm: Normal rate and regular rhythm.      Heart sounds: Normal heart sounds. No murmur heard.     No friction rub. No gallop.   Pulmonary:      Effort: Pulmonary effort is normal. No respiratory distress.      Breath sounds: Decreased air movement present. No transmitted upper airway sounds. Decreased breath sounds present. No wheezing, rhonchi or rales.   Chest:      Chest wall: No tenderness.   Abdominal:      General: Bowel sounds are normal. There is no distension.      Palpations: Abdomen is soft. There is no mass.      Tenderness: There is no abdominal tenderness. There is no guarding.   Musculoskeletal:      Cervical back: Normal range of motion and neck supple.   Lymphadenopathy:      Head:      Right side of head: No submental, submandibular, tonsillar, preauricular or posterior auricular adenopathy.      Left side of head: No submental, submandibular, tonsillar, preauricular or posterior auricular  adenopathy.      Cervical: No cervical adenopathy.      Right cervical: No superficial or posterior cervical adenopathy.     Left cervical: No superficial or posterior cervical adenopathy.   Skin:     General: Skin is warm and dry.      Findings: No rash.   Neurological:      Mental Status: She is alert and oriented to person, place, and time.      Cranial Nerves: No cranial nerve deficit.      Deep Tendon Reflexes: Reflexes are normal and symmetric.   Psychiatric:         Behavior: Behavior normal. Behavior is cooperative.         Thought Content: Thought content normal.         Judgment: Judgment normal.           Al Kaplan PA-C  5/3/2025, 1:55 PM

## 2025-05-03 NOTE — PROGRESS NOTES
Urgent Care Clinic Visit  Rapid rooming was initiated.  Provider was consulted, patient will remain in room and provider will be with patient as soon as possible.  Chief Complaint   Patient presents with    Urgent Care    URI     Per patient symptoms started this Tuesday having cough, sore throat, fever, dizziness, chills, sweats, SOB, fatigued, vomiting, and constipation                5/3/2025     1:44 PM   Additional Questions   Roomed by NOEMI Del Rio   Accompanied by Self     No  Does the patient have a sore throat and either history of fever >100.4 in the previous 24 hours without a cough or recent exposure to a known case of strep throat? Yes {Patient MEETS CRITERIA OK to proceed with order Pre-Provider Visit Orders- Influenza  Is this currently Influenza testing season?:  Yes  Does the patient present with a fever and either bodyaches, fatigue, or cough that have been present less than 48 hours? Yes {Patient MEETS CRITERIA OK to proceed with order

## 2025-05-04 LAB — SARS-COV-2 RNA RESP QL NAA+PROBE: NEGATIVE

## 2025-09-03 ENCOUNTER — ANCILLARY PROCEDURE (OUTPATIENT)
Dept: GENERAL RADIOLOGY | Facility: CLINIC | Age: 65
End: 2025-09-03
Payer: COMMERCIAL

## 2025-09-03 ENCOUNTER — OFFICE VISIT (OUTPATIENT)
Dept: URGENT CARE | Facility: URGENT CARE | Age: 65
End: 2025-09-03
Payer: COMMERCIAL

## 2025-09-03 VITALS
HEART RATE: 92 BPM | BODY MASS INDEX: 47.09 KG/M2 | WEIGHT: 293 LBS | TEMPERATURE: 97.8 F | OXYGEN SATURATION: 97 % | HEIGHT: 66 IN | SYSTOLIC BLOOD PRESSURE: 157 MMHG | DIASTOLIC BLOOD PRESSURE: 80 MMHG | RESPIRATION RATE: 20 BRPM

## 2025-09-03 DIAGNOSIS — R07.81 RIB PAIN ON RIGHT SIDE: ICD-10-CM

## 2025-09-03 DIAGNOSIS — S06.0X0A CONCUSSION WITHOUT LOSS OF CONSCIOUSNESS, INITIAL ENCOUNTER: ICD-10-CM
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
## 2025-09-03 PROBLEM — I26.02 ACUTE SADDLE PULMONARY EMBOLISM WITH ACUTE COR PULMONALE (H): Status: ACTIVE | Noted: 2018-07-22

## 2025-09-03 PROBLEM — Z86.711 HISTORY OF PULMONARY EMBOLISM: Status: ACTIVE | Noted: 2024-06-07

## 2025-09-03 PROCEDURE — 99214 OFFICE O/P EST MOD 30 MIN: CPT

## 2025-09-03 PROCEDURE — 71101 X-RAY EXAM UNILAT RIBS/CHEST: CPT | Mod: TC | Performed by: RADIOLOGY

## 2025-09-03 PROCEDURE — 3079F DIAST BP 80-89 MM HG: CPT

## 2025-09-03 PROCEDURE — 1125F AMNT PAIN NOTED PAIN PRSNT: CPT

## 2025-09-03 PROCEDURE — 3077F SYST BP >= 140 MM HG: CPT

## 2025-09-03 RX ORDER — CYCLOBENZAPRINE HCL 10 MG
10 TABLET ORAL 3 TIMES DAILY PRN
Qty: 30 TABLET | Refills: 0 | Status: SHIPPED | OUTPATIENT
Start: 2025-09-03

## 2025-09-03 ASSESSMENT — PAIN SCALES - GENERAL: PAINLEVEL_OUTOF10: MODERATE PAIN (5)
